# Patient Record
Sex: MALE | Race: WHITE | NOT HISPANIC OR LATINO | Employment: OTHER | URBAN - NONMETROPOLITAN AREA
[De-identification: names, ages, dates, MRNs, and addresses within clinical notes are randomized per-mention and may not be internally consistent; named-entity substitution may affect disease eponyms.]

---

## 2018-06-11 ENCOUNTER — OFFICE VISIT CONVERTED (OUTPATIENT)
Dept: FAMILY MEDICINE CLINIC | Age: 73
End: 2018-06-11
Attending: FAMILY MEDICINE

## 2018-09-24 ENCOUNTER — OFFICE VISIT CONVERTED (OUTPATIENT)
Dept: FAMILY MEDICINE CLINIC | Age: 73
End: 2018-09-24
Attending: FAMILY MEDICINE

## 2019-06-20 ENCOUNTER — OFFICE VISIT CONVERTED (OUTPATIENT)
Dept: FAMILY MEDICINE CLINIC | Age: 74
End: 2019-06-20
Attending: FAMILY MEDICINE

## 2019-06-20 ENCOUNTER — HOSPITAL ENCOUNTER (OUTPATIENT)
Dept: OTHER | Facility: HOSPITAL | Age: 74
Discharge: HOME | End: 2019-06-20
Attending: FAMILY MEDICINE

## 2019-06-20 LAB
ALBUMIN SERPL-MCNC: 4.8 G/DL (ref 3.5–5)
ALBUMIN/GLOB SERPL: 1.6 {RATIO} (ref 1.4–2.6)
ALP SERPL-CCNC: 58 U/L (ref 56–155)
ALT SERPL-CCNC: 23 U/L (ref 10–40)
ANION GAP SERPL CALC-SCNC: 18 MMOL/L (ref 8–19)
AST SERPL-CCNC: 17 U/L (ref 15–50)
BASOPHILS # BLD AUTO: 0.03 10*3/UL (ref 0–0.2)
BASOPHILS NFR BLD AUTO: 0.8 % (ref 0–3)
BILIRUB SERPL-MCNC: 0.5 MG/DL (ref 0.2–1.3)
BUN SERPL-MCNC: 12 MG/DL (ref 5–25)
BUN/CREAT SERPL: 11 {RATIO} (ref 6–20)
CALCIUM SERPL-MCNC: 9.4 MG/DL (ref 8.7–10.4)
CHLORIDE SERPL-SCNC: 101 MMOL/L (ref 99–111)
CHOLEST SERPL-MCNC: 177 MG/DL (ref 107–200)
CHOLEST/HDLC SERPL: 3.7 {RATIO} (ref 3–6)
CONV ABS IMM GRAN: 0.02 10*3/UL (ref 0–0.2)
CONV CO2: 26 MMOL/L (ref 22–32)
CONV CREATININE URINE, RANDOM: 80.7 MG/DL (ref 10–300)
CONV IMMATURE GRAN: 0.5 % (ref 0–1.8)
CONV MICROALBUM.,U,RANDOM: <12 MG/L (ref 0–20)
CONV TOTAL PROTEIN: 7.8 G/DL (ref 6.3–8.2)
CREAT UR-MCNC: 1.12 MG/DL (ref 0.7–1.2)
DEPRECATED RDW RBC AUTO: 43.5 FL (ref 35.1–43.9)
EOSINOPHIL # BLD AUTO: 0.14 10*3/UL (ref 0–0.7)
EOSINOPHIL # BLD AUTO: 3.8 % (ref 0–7)
ERYTHROCYTE [DISTWIDTH] IN BLOOD BY AUTOMATED COUNT: 13.8 % (ref 11.6–14.4)
EST. AVERAGE GLUCOSE BLD GHB EST-MCNC: 183 MG/DL
FERRITIN SERPL-MCNC: 183 NG/ML (ref 30–300)
FOLATE SERPL-MCNC: 15.4 NG/ML (ref 4.8–20)
GFR SERPLBLD BASED ON 1.73 SQ M-ARVRAT: >60 ML/MIN/{1.73_M2}
GLOBULIN UR ELPH-MCNC: 3 G/DL (ref 2–3.5)
GLUCOSE SERPL-MCNC: 223 MG/DL (ref 70–99)
HBA1C MFR BLD: 15.3 G/DL (ref 14–18)
HBA1C MFR BLD: 8 % (ref 3.5–5.7)
HCT VFR BLD AUTO: 45.2 % (ref 42–52)
HDLC SERPL-MCNC: 48 MG/DL (ref 40–60)
IRON SATN MFR SERPL: 20 % (ref 20–55)
IRON SERPL-MCNC: 100 UG/DL (ref 70–180)
LDLC SERPL CALC-MCNC: 97 MG/DL (ref 70–100)
LYMPHOCYTES # BLD AUTO: 1.28 10*3/UL (ref 1–5)
MCH RBC QN AUTO: 29.4 PG (ref 27–31)
MCHC RBC AUTO-ENTMCNC: 33.8 G/DL (ref 33–37)
MCV RBC AUTO: 86.9 FL (ref 80–96)
MICROALBUMIN/CREAT UR: 14.9 MG/G{CRE} (ref 0–25)
MONOCYTES # BLD AUTO: 0.47 10*3/UL (ref 0.2–1.2)
MONOCYTES NFR BLD AUTO: 12.7 % (ref 3–10)
NEUTROPHILS # BLD AUTO: 1.76 10*3/UL (ref 2–8)
NEUTROPHILS NFR BLD AUTO: 47.6 % (ref 30–85)
NRBC CBCN: 0 % (ref 0–0.7)
OSMOLALITY SERPL CALC.SUM OF ELEC: 297 MOSM/KG (ref 273–304)
PLATELET # BLD AUTO: 172 10*3/UL (ref 130–400)
PMV BLD AUTO: 10.2 FL (ref 9.4–12.4)
POTASSIUM SERPL-SCNC: 4.5 MMOL/L (ref 3.5–5.3)
RBC # BLD AUTO: 5.2 10*6/UL (ref 4.7–6.1)
RETICS # AUTO: 0.12 10*6/UL (ref 0.03–0.1)
RETICS/RBC NFR AUTO: 2.35 % (ref 0.51–1.81)
SODIUM SERPL-SCNC: 140 MMOL/L (ref 135–147)
TIBC SERPL-MCNC: 502 UG/DL (ref 245–450)
TRANSFERRIN SERPL-MCNC: 351 MG/DL (ref 215–365)
TRIGL SERPL-MCNC: 160 MG/DL (ref 40–150)
VARIANT LYMPHS NFR BLD MANUAL: 34.6 % (ref 20–45)
VIT B12 SERPL-MCNC: 544 PG/ML (ref 211–911)
VLDLC SERPL-MCNC: 32 MG/DL (ref 5–37)
WBC # BLD AUTO: 3.7 10*3/UL (ref 4.8–10.8)

## 2019-12-20 ENCOUNTER — HOSPITAL ENCOUNTER (OUTPATIENT)
Dept: OTHER | Facility: HOSPITAL | Age: 74
Discharge: HOME | End: 2019-12-20
Attending: FAMILY MEDICINE

## 2019-12-20 ENCOUNTER — OFFICE VISIT CONVERTED (OUTPATIENT)
Dept: FAMILY MEDICINE CLINIC | Age: 74
End: 2019-12-20
Attending: FAMILY MEDICINE

## 2019-12-20 LAB
ALBUMIN SERPL-MCNC: 4.4 G/DL (ref 3.5–5)
ALBUMIN/GLOB SERPL: 1.6 {RATIO} (ref 1.4–2.6)
ALP SERPL-CCNC: 52 U/L (ref 56–155)
ALT SERPL-CCNC: 16 U/L (ref 10–40)
ANION GAP SERPL CALC-SCNC: 16 MMOL/L (ref 8–19)
AST SERPL-CCNC: 13 U/L (ref 15–50)
BASOPHILS # BLD AUTO: 0.03 10*3/UL (ref 0–0.2)
BASOPHILS NFR BLD AUTO: 0.8 % (ref 0–3)
BILIRUB SERPL-MCNC: 0.56 MG/DL (ref 0.2–1.3)
BUN SERPL-MCNC: 11 MG/DL (ref 5–25)
BUN/CREAT SERPL: 11 {RATIO} (ref 6–20)
CALCIUM SERPL-MCNC: 8.9 MG/DL (ref 8.7–10.4)
CHLORIDE SERPL-SCNC: 100 MMOL/L (ref 99–111)
CHOLEST SERPL-MCNC: 165 MG/DL (ref 107–200)
CHOLEST/HDLC SERPL: 3.8 {RATIO} (ref 3–6)
CONV ABS IMM GRAN: 0.03 10*3/UL (ref 0–0.2)
CONV CO2: 26 MMOL/L (ref 22–32)
CONV IMMATURE GRAN: 0.8 % (ref 0–1.8)
CONV TOTAL PROTEIN: 7.2 G/DL (ref 6.3–8.2)
CREAT UR-MCNC: 1.03 MG/DL (ref 0.7–1.2)
DEPRECATED RDW RBC AUTO: 41.9 FL (ref 35.1–43.9)
EOSINOPHIL # BLD AUTO: 0.13 10*3/UL (ref 0–0.7)
EOSINOPHIL # BLD AUTO: 3.3 % (ref 0–7)
ERYTHROCYTE [DISTWIDTH] IN BLOOD BY AUTOMATED COUNT: 13.6 % (ref 11.6–14.4)
EST. AVERAGE GLUCOSE BLD GHB EST-MCNC: 154 MG/DL
GFR SERPLBLD BASED ON 1.73 SQ M-ARVRAT: >60 ML/MIN/{1.73_M2}
GLOBULIN UR ELPH-MCNC: 2.8 G/DL (ref 2–3.5)
GLUCOSE SERPL-MCNC: 163 MG/DL (ref 70–99)
HBA1C MFR BLD: 7 % (ref 3.5–5.7)
HCT VFR BLD AUTO: 41.9 % (ref 42–52)
HDLC SERPL-MCNC: 43 MG/DL (ref 40–60)
HGB BLD-MCNC: 14.1 G/DL (ref 14–18)
LDLC SERPL CALC-MCNC: 102 MG/DL (ref 70–100)
LYMPHOCYTES # BLD AUTO: 1.46 10*3/UL (ref 1–5)
LYMPHOCYTES NFR BLD AUTO: 37.3 % (ref 20–45)
MCH RBC QN AUTO: 28.8 PG (ref 27–31)
MCHC RBC AUTO-ENTMCNC: 33.7 G/DL (ref 33–37)
MCV RBC AUTO: 85.5 FL (ref 80–96)
MONOCYTES # BLD AUTO: 0.55 10*3/UL (ref 0.2–1.2)
MONOCYTES NFR BLD AUTO: 14.1 % (ref 3–10)
NEUTROPHILS # BLD AUTO: 1.71 10*3/UL (ref 2–8)
NEUTROPHILS NFR BLD AUTO: 43.7 % (ref 30–85)
NRBC CBCN: 0 % (ref 0–0.7)
OSMOLALITY SERPL CALC.SUM OF ELEC: 289 MOSM/KG (ref 273–304)
PLATELET # BLD AUTO: 182 10*3/UL (ref 130–400)
PMV BLD AUTO: 10.2 FL (ref 9.4–12.4)
POTASSIUM SERPL-SCNC: 3.9 MMOL/L (ref 3.5–5.3)
RBC # BLD AUTO: 4.9 10*6/UL (ref 4.7–6.1)
SODIUM SERPL-SCNC: 138 MMOL/L (ref 135–147)
TRIGL SERPL-MCNC: 101 MG/DL (ref 40–150)
VLDLC SERPL-MCNC: 20 MG/DL (ref 5–37)
WBC # BLD AUTO: 3.91 10*3/UL (ref 4.8–10.8)

## 2020-09-18 ENCOUNTER — HOSPITAL ENCOUNTER (OUTPATIENT)
Dept: OTHER | Facility: HOSPITAL | Age: 75
Discharge: HOME | End: 2020-09-18
Attending: FAMILY MEDICINE

## 2020-09-18 ENCOUNTER — OFFICE VISIT CONVERTED (OUTPATIENT)
Dept: FAMILY MEDICINE CLINIC | Age: 75
End: 2020-09-18
Attending: FAMILY MEDICINE

## 2020-09-19 LAB
ALBUMIN SERPL-MCNC: 4.9 G/DL (ref 3.5–5)
ALBUMIN/GLOB SERPL: 2 {RATIO} (ref 1.4–2.6)
ALP SERPL-CCNC: 46 U/L (ref 56–155)
ALT SERPL-CCNC: 11 U/L (ref 10–40)
ANION GAP SERPL CALC-SCNC: 15 MMOL/L (ref 8–19)
AST SERPL-CCNC: 14 U/L (ref 15–50)
BILIRUB SERPL-MCNC: 0.51 MG/DL (ref 0.2–1.3)
BUN SERPL-MCNC: 17 MG/DL (ref 5–25)
BUN/CREAT SERPL: 16 {RATIO} (ref 6–20)
CALCIUM SERPL-MCNC: 9.3 MG/DL (ref 8.7–10.4)
CHLORIDE SERPL-SCNC: 103 MMOL/L (ref 99–111)
CHOLEST SERPL-MCNC: 181 MG/DL (ref 107–200)
CHOLEST/HDLC SERPL: 3.3 {RATIO} (ref 3–6)
CONV CO2: 27 MMOL/L (ref 22–32)
CONV CREATININE URINE, RANDOM: 106 MG/DL (ref 10–300)
CONV MICROALBUM.,U,RANDOM: <12 MG/L (ref 0–20)
CONV TOTAL PROTEIN: 7.4 G/DL (ref 6.3–8.2)
CREAT UR-MCNC: 1.09 MG/DL (ref 0.7–1.2)
EST. AVERAGE GLUCOSE BLD GHB EST-MCNC: 123 MG/DL
GFR SERPLBLD BASED ON 1.73 SQ M-ARVRAT: >60 ML/MIN/{1.73_M2}
GLOBULIN UR ELPH-MCNC: 2.5 G/DL (ref 2–3.5)
GLUCOSE SERPL-MCNC: 117 MG/DL (ref 70–99)
HBA1C MFR BLD: 5.9 % (ref 3.5–5.7)
HDLC SERPL-MCNC: 55 MG/DL (ref 40–60)
LDLC SERPL CALC-MCNC: 103 MG/DL (ref 70–100)
MICROALBUMIN/CREAT UR: 11.3 MG/G{CRE} (ref 0–25)
OSMOLALITY SERPL CALC.SUM OF ELEC: 293 MOSM/KG (ref 273–304)
POTASSIUM SERPL-SCNC: 5.1 MMOL/L (ref 3.5–5.3)
SODIUM SERPL-SCNC: 140 MMOL/L (ref 135–147)
TRIGL SERPL-MCNC: 117 MG/DL (ref 40–150)
VLDLC SERPL-MCNC: 23 MG/DL (ref 5–37)

## 2021-03-22 ENCOUNTER — HOSPITAL ENCOUNTER (OUTPATIENT)
Dept: OTHER | Facility: HOSPITAL | Age: 76
Discharge: HOME | End: 2021-03-22
Attending: FAMILY MEDICINE

## 2021-03-22 ENCOUNTER — OFFICE VISIT CONVERTED (OUTPATIENT)
Dept: FAMILY MEDICINE CLINIC | Age: 76
End: 2021-03-22
Attending: FAMILY MEDICINE

## 2021-03-22 LAB
ALBUMIN SERPL-MCNC: 4.2 G/DL (ref 3.5–5)
ALBUMIN/GLOB SERPL: 1.6 {RATIO} (ref 1.4–2.6)
ALP SERPL-CCNC: 45 U/L (ref 56–155)
ALT SERPL-CCNC: 14 U/L (ref 10–40)
ANION GAP SERPL CALC-SCNC: 15 MMOL/L (ref 8–19)
AST SERPL-CCNC: 14 U/L (ref 15–50)
BILIRUB SERPL-MCNC: 0.53 MG/DL (ref 0.2–1.3)
BUN SERPL-MCNC: 15 MG/DL (ref 5–25)
BUN/CREAT SERPL: 13 {RATIO} (ref 6–20)
CALCIUM SERPL-MCNC: 9.1 MG/DL (ref 8.7–10.4)
CHLORIDE SERPL-SCNC: 103 MMOL/L (ref 99–111)
CHOLEST SERPL-MCNC: 155 MG/DL (ref 107–200)
CHOLEST/HDLC SERPL: 3.5 {RATIO} (ref 3–6)
CONV CO2: 25 MMOL/L (ref 22–32)
CONV TOTAL PROTEIN: 6.9 G/DL (ref 6.3–8.2)
CREAT UR-MCNC: 1.2 MG/DL (ref 0.7–1.2)
EST. AVERAGE GLUCOSE BLD GHB EST-MCNC: 146 MG/DL
GFR SERPLBLD BASED ON 1.73 SQ M-ARVRAT: 59 ML/MIN/{1.73_M2}
GLOBULIN UR ELPH-MCNC: 2.7 G/DL (ref 2–3.5)
GLUCOSE SERPL-MCNC: 134 MG/DL (ref 70–99)
HBA1C MFR BLD: 6.7 % (ref 3.5–5.7)
HDLC SERPL-MCNC: 44 MG/DL (ref 40–60)
LDLC SERPL CALC-MCNC: 88 MG/DL (ref 70–100)
OSMOLALITY SERPL CALC.SUM OF ELEC: 291 MOSM/KG (ref 273–304)
POTASSIUM SERPL-SCNC: 4.1 MMOL/L (ref 3.5–5.3)
SODIUM SERPL-SCNC: 139 MMOL/L (ref 135–147)
TRIGL SERPL-MCNC: 113 MG/DL (ref 40–150)
VLDLC SERPL-MCNC: 23 MG/DL (ref 5–37)

## 2021-04-12 ENCOUNTER — HOSPITAL ENCOUNTER (OUTPATIENT)
Dept: OTHER | Facility: HOSPITAL | Age: 76
Discharge: HOME | End: 2021-04-12
Attending: FAMILY MEDICINE

## 2021-04-12 LAB — PSA SERPL-MCNC: 4.36 NG/ML (ref 0–4)

## 2021-05-18 NOTE — PROGRESS NOTES
Judson Ochoa 1945     Office/Outpatient Visit    Visit Date: Mon, Jun 11, 2018 10:28 am    Provider: Jose A Bell MD (Assistant: Susan Nazario MA)    Location: [facility]        Electronically signed by Jose A Bell MD on  06/16/2018 10:55:46 AM                             SUBJECTIVE:        CC:     Mr. Ochoa is a 73 year old White male.  This is a follow-up visit.  physical exam         HPI:         Mr. Ochoa is here for a Medicare wellness visit.  A review of cognitive impairment was performed and was negative.  A review of functional ability and level of safety was performed and was negative He denies having trouble hearing the TV/radio when others do not, having to strain to hear or understand conversations and wearing hearing aid(s).  Concerning home safety, he reports that at home he DOES have a slippery bath or shower and functioning smoke alarms, but not throw rugs, poor lighting, grab bars in the bath or handrails on stairs.      Immunization Status: ** Has not received pneumococcal vaccination; ** Has not received influenza vaccine for this season; ** Has not received hepatitis B immunization; Age>60, no shingles vaccination;     Physical Activity: ** Exercises infrequently; Has not had any falls or only one fall without injury in the past year.  Advance Care Directive updated today in [facility] Tobacco: Current Smoker: He currently smokes every day, 1/2 to 1 pack per day.    Preventative Health updated today.          PHQ-9 Depression Screening: Completed form scanned and in chart; Total Score 0 Alcohol Consumption Screening: Completed form scanned and in chart; Total Score 3         With regard to the type 2 diabetes, compliance with treatment has been good.  He specifically denies associated symptoms, including nocturia, polydipsia and polyuria.  He does not perform home blood glucose monitoring.          With regard to the mixed hyperlipidemia, compliance with  treatment has been good.  He specifically denies associated symptoms, including muscle pain and weakness.      Had a pack of Pall Mall in his pocket.     ROS:     CONSTITUTIONAL:  Negative for chills, fatigue, fever and weight change.      CARDIOVASCULAR:  Negative for chest pain, orthopnea, paroxysmal nocturnal dyspnea and pedal edema.      RESPIRATORY:  Negative for dyspnea and cough.      GASTROINTESTINAL:  Negative for abdominal pain, heartburn, constipation, diarrhea, and stool changes.      GENITOURINARY:  Negative for dysuria and polyuria.      PSYCHIATRIC:  Negative for anxiety and depression.          PMH/FMH/SH:     Last Reviewed on 10/11/2017 12:59 PM by Jose A Bell    Past Medical History:     UNREMARKABLE         PREVENTIVE HEALTH MAINTENANCE             BONE DENSITY: has never been done     COLORECTAL CANCER SCREENING:; colonoscopy with normal results; ;10-     EYE EXAM: none documented     MAMMOGRAM: N/A     PAP SMEAR: N/A     PSA: was last done 2014 with normal results (3.09)         PAST MEDICAL HISTORY             ADVANCE DIRECTIVE Living will on file         Surgical History:         Other Surgeries:    circumcision ;     Procedures:    Colonoscopy ( /normal )         Family History:     Father: Type 2 Diabetes     Mother:  at age 82; Cause of death was CHF;  Coronary Artery Disease;  Type 2 Diabetes     Brother(s): Cerebrovascular Accident;  Type 1 Diabetes         Social History: enjoys hunting -     Occupation: Manager of Safety and Staff [employer]     Marital Status:      Children: 1 child         Tobacco/Alcohol/Supplements:     Last Reviewed on 2018 10:32 AM by Susan Nazario    Tobacco: Current Smoker: He currently smokes every day, 1 pack per day.  Quit smoking 2010 Smoking again since 2011. Quit May 1, 2012         Substance Abuse History:     Last Reviewed on 10/11/2017 12:59 PM by Jose A Bell     None         Mental Health History:     Last Reviewed on 10/11/2017 12:59 PM by Jose A Bell        Communicable Diseases (eg STDs):     Last Reviewed on 10/11/2017 12:59 PM by Jose A Bell            Allergies:     Last Reviewed on 10/11/2017 12:59 PM by Jose A Bell      No Known Drug Allergies.         Current Medications:     Last Reviewed on 10/11/2017 12:59 PM by Jose A Bell    Tricor 145mg Tablet Take 1 tablet(s) by mouth daily         OBJECTIVE:        Vitals:         Current: 6/11/2018 10:32:18 AM    Ht:  6 ft, 2.75 in;  Wt: 210.6 lbs;  BMI: 26.5    T: 97.3 F (oral);  BP: 128/81 mm Hg (right arm, sitting);  P: 59 bpm (right arm (BP Cuff), sitting);  sCr: 1.06 mg/dL;  GFR: 69.80        Exams:     PHYSICAL EXAM:     GENERAL:  well developed and nourished; appropriately groomed; in no apparent distress;     EYES: Nonicteric and with unremarkable lids, iris and pupils;     E/N/T:  normal EACs, TMs, nasal/oral mucosa, teeth, gingiva, and oropharynx;     NECK: carotid exam reveals no bruits;     RESPIRATORY: normal respiratory rate and pattern with no distress; normal breath sounds with no rales, rhonchi, wheezes or rubs;     CARDIOVASCULAR: normal rate; rhythm is regular;  no systolic murmur;     LYMPHATIC: no enlargement of cervical or facial nodes;     MUSCULOSKELETAL: normal overall tone No pedal edema.;     NEUROLOGIC: No lateralizing deficit.;     PSYCHIATRIC:  appropriate affect and demeanor; normal speech pattern; grossly normal memory;         ASSESSMENT           V70.0   Z00.00  Health checkup              DDx:     V79.0   Z13.89  Screening for depression              DDx:     250.00   E11.9  Type 2 diabetes              DDx:     272.2   E78.2  Mixed hyperlipidemia              DDx:     305.1   F17.210  Tobacco abuse              DDx:         ORDERS:         Lab Orders:       [id number]  DIAB - [facility] LIPID,CMP, A1C: 41335, 92169, 90869  (Send-Out)           Other Orders:          Smoking and Tobacco Cessation 3 to 10 minutes  (In-House)           Depression screen negative  (In-House)           Subsequent Annual Well Visit Medicare (x1)                 PLAN:          Health checkup Reviewed the preventive service recommendations and created individualized handout.  Will get US for AAA screen.  I see CT in 2008 did show early evidence. Recommend Hep A shots          Screening for depression     MIPS Negative Depression Screen           Orders:         Depression screen negative  (In-House)            Type 2 diabetes     LABORATORY:  Labs ordered to be performed today include Diabetes Panel 1; CMP, Lipid, A1C.            Orders:       [id number]  DIAB - [facility] LIPID,CMP, A1C: 96099, 73697, 21920  (Send-Out)            Mixed hyperlipidemia cont rx, ck lab          Tobacco abuse Counseled x 4 min on smoking cessation and ways in which we could help. He has done the [place] method in past. Gave info on Quit Now too.           Orders:         Smoking and Tobacco Cessation 3 to 10 minutes  (In-House)               CHARGE CAPTURE           **Please note: ICD descriptions below are intended for billing purposes only and may not represent clinical diagnoses**        Primary Diagnosis:         V70.0 Health checkup            Z00.00    Encounter for general adult medical examination without abnormal findings              Orders:          57003   Preventive medicine, established patient, age 65+ years  (In-House)                                           Subsequent Annual Well Visit Medicare (x1)         V79.0 Screening for depression            Z13.89    Encounter for screening for other disorder              Orders:          08697 -25  Office/outpatient visit; established patient, level 3  (In-House)                Depression screen negative  (In-House)           250.00 Type 2 diabetes            E11.9    Type 2 diabetes mellitus without complications    272.2  Mixed hyperlipidemia            E78.2    Mixed hyperlipidemia    305.1 Tobacco abuse            F17.210    Nicotine dependence, cigarettes, uncomplicated              Orders:             Smoking and Tobacco Cessation 3 to 10 minutes  (In-House)               ADDENDUMS:      ____________________________________    Addendum: 06/18/2018 09:03 AM - Noelle Blankenship         Visit Note Faxed to:        Magali Sneed (Vascular Medicine); Number [phone]

## 2021-05-18 NOTE — PROGRESS NOTES
"Judson Ochoa. 1945     Office/Outpatient Visit    Visit Date: Mon, Sep 24, 2018 01:49 pm    Provider: Jose A Bell MD (Assistant: Eileen Gillespie MA)    Location: [facility]        Electronically signed by Jose A Blel MD on  09/26/2018 01:18:58 PM                             SUBJECTIVE:        CC:     Mr. Ochoa is a 73 year old White male.  presents today due to complaints of lower right back pain X 2 weeks         HPI:     Long story short he had his AAA repaired by Dr Ruvalcaba at . Was 8 hour procedure and stayed overnight.  He was quite bruised after the procedure with dependent bruising in groin and legs, but that has all resolved.  He has a follow up scheduled Oct 22nd.     A few days after discharge, despite the hematoma, he went dove hunting.  Was sitting stooped over all day which lead to some back pain and also some testicular pain. He says he even had some blood in his shorts one day that he thinks was from semen.  he did have a ward catheter while in hospital, which he says \"torn me up for 4-5 days after I got home.\" He is not having any urinary symptoms now.         Dx with type 2 diabetes; compliance with treatment has been good.  He specifically denies associated symptoms, including nocturia, polydipsia and polyuria.  He reports home blood glucose readings have been high, with average fasting readings in the mid-200s mg/dL range. He checks his glucose 1 to 2 times daily.  Blames the sweet corn.  In past two-three weeks has been in 120-130 range     ROS:     CONSTITUTIONAL:  Negative for chills, fatigue, fever and weight change.      CARDIOVASCULAR:  Negative for chest pain, orthopnea, paroxysmal nocturnal dyspnea and pedal edema.      RESPIRATORY:  Negative for dyspnea and cough.      GASTROINTESTINAL:  Negative for abdominal pain, heartburn, constipation, diarrhea, and stool changes.      GENITOURINARY:  Negative for dysuria and polyuria.      PSYCHIATRIC:  " Negative for anxiety and depression.          PMH/FMH/SH:     Last Reviewed on 10/11/2017 12:59 PM by Jose A Bell    Past Medical History:     UNREMARKABLE         PREVENTIVE HEALTH MAINTENANCE             BONE DENSITY: has never been done     COLORECTAL CANCER SCREENING:; colonoscopy with normal results; ;10-     EYE EXAM: none documented     MAMMOGRAM: N/A     PAP SMEAR: N/A     PSA: was last done 2014 with normal results (3.09)         PAST MEDICAL HISTORY             ADVANCE DIRECTIVE Living will on file         Surgical History:         Other Surgeries:    circumcision ;     Procedures:    Colonoscopy ( /normal )         Family History:     Father: Type 2 Diabetes     Mother:  at age 82; Cause of death was CHF;  Coronary Artery Disease;  Type 2 Diabetes     Brother(s): Cerebrovascular Accident;  Type 1 Diabetes         Social History: enjoys [employer] -     Occupation: Manager of Safety and Staff [employer]     Marital Status:      Children: 1 child         Tobacco/Alcohol/Supplements:     Last Reviewed on 2018 10:38 AM by Susan Nazario    Tobacco: Current Smoker: He currently smokes every day, 1 pack per day.  Quit smoking 2010 Smoking again since 2011. Quit May 1, 2012         Substance Abuse History:     Last Reviewed on 10/11/2017 12:59 PM by Jose A Bell    None         Mental Health History:     Last Reviewed on 10/11/2017 12:59 PM by Jose A Bell        Communicable Diseases (eg STDs):     Last Reviewed on 10/11/2017 12:59 PM by Jose A Bell            Allergies:     Last Reviewed on 2018 10:38 AM by Susan Nazario      No Known Drug Allergies.         Current Medications:     Last Reviewed on 2018 10:40 AM by Susan Nazario    Metformin HCl 500mg Tablet 1 po TID     Tricor 145mg Tablet Take 1 tablet(s) by mouth daily     Metformin HCl 500mg Tablets, Extended Release 1 TID          OBJECTIVE:        Vitals:         Current: 9/24/2018 1:54:27 PM    Ht:  6 ft, 2.75 in;  Wt: 217.2 lbs;  BMI: 27.3    T: 97.7 F (oral);  BP: 143/84 mm Hg (right arm, sitting);  P: 52 bpm (right arm (BP Cuff), sitting);  sCr: 1.07 mg/dL;  GFR: 70.06        Exams:     PHYSICAL EXAM:     GENERAL:  well developed and nourished; appropriately groomed; in no apparent distress;     EYES: Nonicteric and with unremarkable lids, iris and pupils;     E/N/T:  normal EACs, TMs, nasal/oral mucosa, teeth, gingiva, and oropharynx;     NECK: carotid exam reveals no bruits;     RESPIRATORY: normal respiratory rate and pattern with no distress; normal breath sounds with no rales, rhonchi, wheezes or rubs;     CARDIOVASCULAR: normal rate; rhythm is regular;  no systolic murmur;     GASTROINTESTINAL: nontender, nondistended; no hepatosplenomegaly or masses; no bruits; has a couple scars in groins from his procedure, but no bruising now;     LYMPHATIC: no enlargement of cervical or facial nodes;     MUSCULOSKELETAL: normal overall tone No pedal edema.;     NEUROLOGIC: No lateralizing deficit.;     PSYCHIATRIC:  appropriate affect and demeanor; normal speech pattern; grossly normal memory;         ASSESSMENT           441.4   I71.4  AAA              DDx:     724.5   M54.5  Back pain              DDx:     608.82   R36.1  Hematospermia              DDx:     250.00   E11.9  Type 2 diabetes              DDx:         ORDERS:         Lab Orders:       [id number]  DIAB1 - [facility] LIPID,CMP, A1C: 93326, 58962, 20240  (Send-Out)         16558  BDUA - [facility] Urinalysis, automated, with micro  (Send-Out)         65380  CB - [facility] CBC w/o diff  (Send-Out)                   PLAN: declined flu shot Recommended Hep A vaccine, but he declined.          AAA Will be following up with Dr. Berg Oct 22nd. We will send for records.     LABORATORY:  Labs ordered to be performed today include CBC W/O DIFF.            Orders:       [id number]  [facility] - [facility] CBC w/o diff   (Send-Out)            Back pain Sounds like it was musculoskeletal related to his posture while hunting. Better now, so will follow.           Hematospermia check urine.  I suspect was related to the catheter     LABORATORY:  Labs ordered to be performed today include urinalysis with micro.            Orders:       [id number]  [facility]  (Send-Out)            Type 2 diabetes check lab, continue Rx,     LABORATORY:  Labs ordered to be performed today include Diabetes Panel 1; CMP, Lipid, A1C.            Orders:       [id number]  DIAB - [facility] LIPID,CMP, A1C: 80508, 31223, 22031  (Send-Out)               CHARGE CAPTURE           **Please note: ICD descriptions below are intended for billing purposes only and may not represent clinical diagnoses**        Primary Diagnosis:         441.4 AAA            I71.4    Abdominal aortic aneurysm, without rupture              Orders:          27114   Office/outpatient visit; established patient, level 4  (In-House)           724.5 Back pain            M54.5    Low back pain    608.82 Hematospermia            R36.1    Hematospermia    250.00 Type 2 diabetes            E11.9    Type 2 diabetes mellitus without complications

## 2021-05-18 NOTE — PROGRESS NOTES
Judson Ochoa 1945     Office/Outpatient Visit    Visit Date: Thu, Jun 20, 2019 11:54 am    Provider: Jose A Bell MD (Assistant: Sarah Spurling, MA)    Location: [facility]        Electronically signed by Jose A Bell MD on  06/26/2019 10:49:29 AM                             SUBJECTIVE:        CC:     Mr. Ochoa is a 74 year old White male.  The patient is accompanied into the exam room by his spouse and Lili (wife).  Medicare Wellness. No longer taking plavix.          HPI:         Mr. Ochoa is here for a Medicare wellness visit.  The required HRA questions are integrated within this visit note. Family medical history and individual medical/surgical history were reviewed and updated.  A current height, weight, BMI, blood pressure, and pulse were recorded in the vitals section of the note and have been reviewed. Patient's medications, including supplements, were recorded in the chart and reviewed.  Current providers and suppliers were reviewed and updated.          Self-Assessment of Health: He rates his health as excellent. He rates his confidence of being able to control/manage most of his health problems as very confident. His physical/emotional health has limited his social activites not at all.  A review of cognitive impairment was performed, including ability to drive a car, manage finances, and any memory changes, and was found to be negative.  A review of functional ability, including bathing, dressing, walking, and urine/bowel continence as well as level of safety was performed and was found to be negative.  Falls Risk: Has not had any falls or only one fall without injury in the past year.  In regard to hearing, he reports having trouble hearing the TV/radio when others do not and having to strain to hear or understand conversations, but not wearing hearing aid(s).  Concerning home safety, he reports that at home he DOES have adequate lighting, a skid  resistant shower/tub, grab bars in the bath, handrails on stairs and functioning smoke alarms, but not absence of throw rugs.  Physical Activity: Pt stays active, lots of walking and gardening, mowing.; Type of diet patient normally eats is described as well-balanced with fruits and vegetables Tobacco: He has a past history of cigarette smoking; quit date:  June 2018.  Preventative Health updated today.          PHQ-9 Depression Screening: Completed form scanned and in chart; Total Score 0 Alcohol Consumption Screening: Completed form scanned and in chart; Total Score 11 Concerning screening for depression,         Concerning mixed hyperlipidemia, compliance with treatment has been good.  He specifically denies associated symptoms, including muscle pain and weakness.          Type 2 diabetes details; compliance with treatment has been good.  He specifically denies associated symptoms, including nocturia, polydipsia and polyuria.  He does not perform home blood glucose monitoring.      He recently is undergone repair of AAA at [facility].  He did well with the surgery and has made good recovery. Reviewed the results from the vascular surgeon and note is made there about a renal mass.  Subsequently referred to urology.  In his wife state that they really were not notified about the renal mass or the urology referral until after the visit.  I reviewed that information with him and we also reviewed his previous CT scan which probably reflects the same mass.  It is reassuring that it is chronic in nature but I think is probably still wise for him to go ahead and see the urologist.  He can just take a copy of his previous scan results along with him so they can be compared.     We also reviewed his lab work from the end of last year which showed that he had anemia and leukocytopenia discussed possible significance that possible underlying myelodysplasia.     He does report some difficulty with erectile  function.  Has used Cialis in the past.  Discussed the use sildenafil since it is now generic and he wants to give that a try         PHQ-9 Depression Screening: Completed form scanned and in chart; Total Score 0 Alcohol Consumption Screening: Completed form scanned and in chart; Total Score 1     PMH/FMH/SH:     Last Reviewed on 2019 12:52 PM by Jose A Bell    Past Medical History:     UNREMARKABLE         PREVENTIVE HEALTH MAINTENANCE             COLORECTAL CANCER SCREENING: Up to date (colonoscopy q10y; sigmoidoscopy q5y; Cologuard q3y) was last done 10-05-07, Results are in chart; colonoscopy with normal results; ;10-     DENTAL CLEANING: Over a year.      EYE EXAM: was last done 2019     PSA: was last done 2014 with normal results (3.09)         PAST MEDICAL HISTORY             ADVANCE DIRECTIVE Living will on file         Surgical History:         Other Surgeries:    circumcision ;     Procedures:    Colonoscopy ( /normal )     AAA repair via Endovascular Stenting ;         Family History:     Father: Type 2 Diabetes     Mother:  at age 82; Cause of death was CHF;  Coronary Artery Disease;  Type 2 Diabetes     Brother(s): Cerebrovascular Accident;  Type 1 Diabetes         Social History: enjoys hunting -     Occupation: Manager of Safety and Staff [employer]     Marital Status:      Children: 1 child         Tobacco/Alcohol/Supplements:     Last Reviewed on 2019 11:58 AM by Spurling, Sarah C    Tobacco: He has a past history of cigarette smoking; quit date:  2018.          Substance Abuse History:     Last Reviewed on 10/11/2017 12:59 PM by Jose A Bell    None         Mental Health History:     Last Reviewed on 10/11/2017 12:59 PM by Jose A Bell        Communicable Diseases (eg STDs):     Last Reviewed on 10/11/2017 12:59 PM by Jose A Bell            Allergies:     Last Reviewed on 2019  11:58 AM by Spurling, Sarah C      No Known Drug Allergies.         Current Medications:     Last Reviewed on 6/20/2019 11:59 AM by Spurling, Sarah C    Metformin HCl 500mg Tablet 1 po TID     Tricor 145mg Tablet Take 1 tablet(s) by mouth daily     Aspirin (ASA) 81mg Tablets, Enteric Coated 1 tab daily         OBJECTIVE:        Vitals:         Current: 6/20/2019 12:05:59 PM    Ht:  6 ft, 2.75 in;  Wt: 210.8 lbs;  BMI: 26.5    T: 97.6 F (oral);  BP: 140/77 mm Hg (left arm, sitting);  P: 60 bpm (left arm (BP Cuff), sitting);  sCr: 1.16 mg/dL;  GFR: 62.91    VA: 20/40 OD, 20/30 OS (near, with correction)        ASSESSMENT           V70.0   Z00.00  Health checkup              DDx:     V79.0   Z13.89  Screening for depression              DDx:     272.2   E78.2  Mixed hyperlipidemia              DDx:     250.00   E11.9  Type 2 diabetes              DDx:     441.4   I71.4  AAA              DDx:     236.91   D41.02  Renal mass              DDx:     288.50   D72.819  Leukocytopenia, unspecified              DDx:     285.9   D64.9  Unspecified anemia              DDx:     302.72   F52.21  Erectile dysfunction              DDx:         ORDERS:         Meds Prescribed:       Sildenafil Citrate 20mg Tablet Take 1, 2, 3, 4, or 5 tablets 1 hour before sexual activity  #100 (One [place]) tablet(s) Refills: 0         Lab Orders:       43129  BDCBC - [facility] CBC with 3 part diff  (Send-Out)         70625  FERR - [facility] Ferritin Serum  (Send-Out)         95603  FOL - [facility] Folate; folic acid serum  (Send-Out)         47023  RETEC - [facility] Reticulocyte count  (Send-Out)         17388  IRONP - [facility] Iron and TIBC  (Send-Out)         34578  VB12 - [facility] Vitamin B12  (Send-Out)         49939  DIAB2 - [facility] CMP A1C LIPID AND MICRO ALBUM CR RATIO: [id number]  (Send-Out)           Other Orders:         Depression screen negative  (In-House)           Subsequent Annual Well Visit Medicare (x1)                 PLAN:          Health  checkup We reviewed the preventive service recommendations and created an individualized handout          Screening for depression     MIPS Negative Depression Screen           Orders:         Depression screen negative  (In-House)            Mixed hyperlipidemia Continue his current medication.  We will predicate in changes based on lab results          Type 2 diabetes We will go ahead and check labs and predicate any medication changes based on those results     LABORATORY:  Labs ordered to be performed today include Diabetes Panel 2;CMP, A1C, Lipid, Microalbumin:Creatinine Ratio.            Orders:       18530  DIAB2 - [facility] CMP A1C LIPID AND MICRO ALBUM CR RATIO: [id number],[id number],89237,83005,20190  (Send-Out)            AAA Follow with vascular surgery as per their direction          Renal mass I do think he should keep the appointment with urologist.  Take a copy of the previous CT scan results with him          Leukocytopenia, unspecified We will get repeat labs.  May end up needing further evaluation by hematology          Unspecified anemia     LABORATORY:  Labs ordered to be performed today include Anemia profile CBC ferritin Folate Reticulocyte ct Serum iron Vitamin B12.            Orders:       21804  BDCBC - [facility] CBC with 3 part diff  (Send-Out)         97432  FERR - [facility] Ferritin Serum  (Send-Out)         53421  FOL - [facility] Folate; folic acid serum  (Send-Out)         12629  RETEC - [facility] Reticulocyte count  (Send-Out)         63705  IRONP - [facility] Iron and TIBC  (Send-Out)         09302  VB12 - [facility] Vitamin B12  (Send-Out)            Erectile dysfunction           Prescriptions:       Sildenafil Citrate 20mg Tablet Take 1, 2, 3, 4, or 5 tablets 1 hour before sexual activity  #100 (One [place]) tablet(s) Refills: 0             Patient Recommendations:    Dragon transcription disclaimer:        Much of this encounter note is an electronic transcription/translation of spoken language to printed text.  The electronic  translation of spoken language may permit erroneous, or at times, nonsensical words or phrases to be inadvertently transcribed.  Although I have reviewed the note for such errors, some may still exist.             CHARGE CAPTURE           **Please note: ICD descriptions below are intended for billing purposes only and may not represent clinical diagnoses**        Primary Diagnosis:         V70.0 Health checkup            Z00.00    Encounter for general adult medical examination without abnormal findings              Orders:          90597   Preventive medicine, established patient, age 65+ years  (In-House)                                           Subsequent Annual Well Visit Medicare (x1)         V79.0 Screening for depression            Z13.89    Encounter for screening for other disorder              Orders:          19329 -25  Office/outpatient visit; established patient, level 4  (In-House)                Depression screen negative  (In-House)           272.2 Mixed hyperlipidemia            E78.2    Mixed hyperlipidemia    250.00 Type 2 diabetes            E11.9    Type 2 diabetes mellitus without complications    441.4 AAA            I71.4    Abdominal aortic aneurysm, without rupture    236.91 Renal mass            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    288.50 Leukocytopenia, unspecified            D72.819    Decreased white blood cell count, unspecified    285.9 Unspecified anemia            D64.9    Anemia, unspecified    302.72 Erectile dysfunction            F52.21    Male erectile disorder        ADDENDUMS:      ____________________________________    Addendum: 06/27/2019 11:26 PM - Jose A Bell        PHYSICAL EXAM:     GENERAL:  well developed and nourished; appropriately groomed; in no apparent distress;     EYES: Nonicteric and with unremarkable lids, iris and pupils;     E/N/T:  normal EACs, TMs, nasal/oral mucosa, teeth, gingiva, and oropharynx;     NECK:  carotid exam reveals no bruits;     RESPIRATORY: normal respiratory rate and pattern with no distress; normal breath sounds with no rales, rhonchi, wheezes or rubs;     CARDIOVASCULAR: normal rate; rhythm is regular;  no systolic murmur;     GASTROINTESTINAL: nontender, nondistended; no hepatosplenomegaly or masses; no bruits; has a couple scars in groins from his procedure, but no bruising now;     LYMPHATIC: no enlargement of cervical or facial nodes;     MUSCULOSKELETAL: normal overall tone No pedal edema.; Feet without lesions. DP, PT pulses palpable bilat    NEUROLOGIC: No lateralizing deficit.; Monofilament testing feet okay bilat    PSYCHIATRIC:  appropriate affect and demeanor; normal speech pattern; grossly normal memory;

## 2021-05-18 NOTE — PROGRESS NOTES
Judson Ochoa  1945     Office/Outpatient Visit    Visit Date: Mon, Mar 22, 2021 11:19 am    Provider: Jose A Bell MD (Assistant: La Uribe MA)    Location: [facility]        Electronically signed by Jose A Bell MD on  03/22/2021 12:37:12 PM                             Subjective:        CC: Mr. Ochoa is a 75 year old White male.  This is a follow-up visit.  mcw pt states he is not taking aspirin         HPI:           Mr. Ochoa is here for a Medicare wellness visit.          Self-Assessment of Health: He rates his health as very good. He rates his confidence of being able to control/manage most of his health problems as very confident. His physical/emotional health has limited his social activites not at all.  A review of cognitive impairment was performed, including ability to drive a car, manage finances, and any memory changes, and was found to be negative.  A review of functional ability, including bathing, dressing, walking, and urine/bowel continence as well as level of safety was performed and was found to be negative.  Falls Risk: Has not had any falls or only one fall without injury in the past year.  He denies having trouble hearing the TV/radio when others do not, having to strain to hear or understand conversations and wearing hearing aid(s).  Concerning home safety, he reports that at home he DOES have adequate lighting, a skid resistant shower/tub, grab bars in the bath, functioning smoke alarms and absence of throw rugs, but not handrails on stairs.  Physical Activity: He exercises but less than 20 minutes 3 days per week; Type of diet patient normally eats is described as well-balanced with fruits and vegetables Tobacco: Current Smoker: He currently smokes every day, 1 pack per day.  Preventative Health updated today.        Patient also here to follow-up on chronic health conditions.  In regards to his diabetes he continues to take the  Metformin without difficulty.  He does check his blood sugar occasionally and generally runs good.He continues to take TriCor for his hyperlipidemia without adverse effect.      Has a history of AAA followed by vascular surgery at [facility].  Says he has a follow-up appointment in April.  He also recently saw urology at [facility] they have been following a renal mass and determine its likely hemorrhagic cyst.  They have released him from follow-up stating that he can be monitored with the CT scans from vascular surgery on a as needed basis.      He is not having any symptoms of claudication but does report having plantar fasciitis type discomfort in his feet.  He does stretch before bedtime.  Has inserts in his shoes.          PHQ-9 Depression Screening: Completed form scanned and in chart; Total Score 0     ROS:     CONSTITUTIONAL:  Negative for chills, fatigue and fever.      CARDIOVASCULAR:  Negative for chest pain, orthopnea, paroxysmal nocturnal dyspnea and pedal edema.      RESPIRATORY:  Negative for dyspnea and cough.      GASTROINTESTINAL:  Negative for abdominal pain, heartburn, constipation, diarrhea, and stool changes.      GENITOURINARY:  Negative for dysuria and polyuria.      PSYCHIATRIC:  Negative for anxiety and depression.          Past Medical History / Family History / Social History:         Last Reviewed on 3/22/2021 12:29 PM by Jose A Bell    Past Medical History:     UNREMARKABLE         PREVENTIVE HEALTH MAINTENANCE             COLORECTAL CANCER SCREENING: Up to date (colonoscopy q10y; sigmoidoscopy q5y; Cologuard q3y) was last done 10-05-07, Results are in chart; colonoscopy with normal results; ;10-     DENTAL CLEANING: Over a year.      EYE EXAM: was last done April 2019     PSA: was last done 05- with normal results (3.09)         PAST MEDICAL HISTORY             ADVANCE DIRECTIVE Living will on file         Surgical History:         Other  Surgeries:    circumcision ;     Procedures:    Colonoscopy ( /normal )     AAA repair via Endovascular Stenting ;         Family History:     Father: Type 2 Diabetes     Mother:  at age 82; Cause of death was CHF;  Coronary Artery Disease;  Type 2 Diabetes     Brother(s): Cerebrovascular Accident;  Type 1 Diabetes         Social History: enjoys [place] -     Occupation: Manager of Safety and Staff [employer]     Marital Status:      Children: 1 child         Tobacco/Alcohol/Supplements:     Last Reviewed on 3/22/2021 11:38 AM by La Uribe    Tobacco: Current Smoker: He currently smokes every day, 1 pack per day.          Substance Abuse History:     Last Reviewed on 10/11/2017 12:59 PM by Jose A Bell    None         Mental Health History:     Last Reviewed on 10/11/2017 12:59 PM by Jose A Bell        Communicable Diseases (eg STDs):     Last Reviewed on 10/11/2017 12:59 PM by Jose A Bell        Allergies:     Last Reviewed on 3/22/2021 11:38 AM by La Uribe    No Known Allergies.        Current Medications:     Last Reviewed on 3/22/2021 11:38 AM by La Uribe    metFORMIN 500 mg oral tablet [TAKE 1 TABLET THREE TIMES A DAY]    Tricor 145 mg oral tablet [TAKE 1 TABLET DAILY]    aspirin 81 mg oral tablet, delayed release (enteric coated) [1 tab daily]    sildenafil (pulm.hypertension) 20 mg oral tablet [TAKE 1, 2, 3, 4, OR 5 TABLETS 1 HOUR BEFORE SEXUAL ACTIVITY]    blood sugar diagnostic strips  [use to check bs daily]    FreeStyle Lite Meter kit  [use to check bs daily with freestyle lite strips]        Objective:        Vitals:         Current: 3/22/2021 11:40:46 AM    Ht:  6 ft, 2.75 in;  Wt: 211 lbs;  BMI: 26.5T: 96.8 F (temporal);  BP: 155/79 mm Hg (left arm, sitting);  P: 67 bpm (left arm (BP Cuff), sitting);  sCr: 1.09 mg/dL;  GFR: 66.03        Repeat:     11:46:14 AM  BP:   126/67mm Hg (left arm, sitting, P-70)      Exams: right dub contracture    PHYSICAL EXAM:     GENERAL:  well developed and nourished; appropriately groomed; in no apparent distress;     EYES: Nonicteric and with unremarkable lids, iris and pupils;     E/N/T:  normal EACs, TMs, nasal/oral mucosa, teeth, gingiva, and oropharynx;     NECK: carotid exam reveals no bruits;     RESPIRATORY: normal respiratory rate and pattern with no distress; normal breath sounds with no rales, rhonchi, wheezes or rubs;     CARDIOVASCULAR: normal rate; rhythm is regular;  no systolic murmur;     GASTROINTESTINAL: nontender, nondistended; no hepatosplenomegaly or masses; no bruits; has a couple scars in groins from his procedure, well healed;     LYMPHATIC: no enlargement of cervical or facial nodes;     MUSCULOSKELETAL: normal overall tone No pedal edema.; Dupuytren's contracture right hand    NEUROLOGIC: No lateralizing deficit.;     PSYCHIATRIC:  appropriate affect and demeanor; normal speech pattern; grossly normal memory;         Assessment:         Z00.00   Encounter for general adult medical examination without abnormal findings       E78.2   Mixed hyperlipidemia       E11.9   Type 2 diabetes mellitus without complications       I71.4   Abdominal aortic aneurysm, without rupture       I73.9   Peripheral vascular disease, unspecified       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       Z13.31   Encounter for screening for depression       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.0   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  Not inclined to take the Covid vaccine he says.  I did address some of his concerns and encouraged him to reconsider    ADVANCE DIRECTIVES (Please update in PMH): Living will on file         Mixed hyperlipidemiaCheck lab predicate medication changes based on results        Type 2 diabetes mellitus without complicationsSounds like his home blood sugars are doing okay.  We will check lab and predicate changes based on results    LABORATORY:  Labs ordered to be performed today include Diabetes Panel 1; CMP, Lipid, A1C.            Orders:       [id number]  DIAB - [facility] LIPID,CMP, A1C: 31490, 49727, 90299  (Send-Out)              Abdominal aortic aneurysm, without ruptureContinue to follow with vascular surgery as recommended        Peripheral vascular disease, unspecifiedContinue to follow with vascular surgery as recommended        Neoplasm of uncertain behavior of left kidneyReviewed the note from urology.        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  The electronic translation of spoken language may permit erroneous, or at times, nonsensical words or phrases to be inadvertently transcribed.  Although I have reviewed the note for such errors, some may still exist.        Charge Capture:         Primary Diagnosis:     Z00.00  Encounter for general adult  medical examination without abnormal findings           Orders:      18811  Preventive medicine, established patient, age 65+ years  (In-House)              Subsequent Annual Well Visit Medicare  (x1)          E78.2  Mixed hyperlipidemia           Orders:      84290-67  Office/outpatient visit; established patient, level 4  (In-House)              E11.9  Type 2 diabetes mellitus without complications     I71.4  Abdominal aortic aneurysm, without rupture     I73.9  Peripheral vascular disease, unspecified     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

## 2021-05-18 NOTE — PROGRESS NOTES
"Judson Ochoa  1945     Office/Outpatient Visit    Visit Date: Fri, Dec 20, 2019 09:56 am    Provider: Jose A Bell MD (Assistant: Silvia Hunter, )    Location: [facility]        Electronically signed by Jose A Bell MD on  01/02/2020 07:07:31 PM                             Subjective:        CC: Mr. Ochoa is a 74 year old White male.  This is a follow-up visit.          HPI:           Mr. Ochoa presents with mixed hyperlipidemia.  Compliance with treatment has been good.  He specifically denies associated symptoms, including muscle pain and weakness.            In regard to the type 2 diabetes mellitus without complications, compliance with treatment has been good.  He specifically denies associated symptoms, including nocturia, polydipsia and polyuria.  He reports home blood glucose readings have been excellent, with average fasting glucoses running <120 mg/dL. He checks his glucose 1 to 2 times daily.        Has hx of Aortic Aneurysm thoracic and abdominal.  He has follow up in February.  He also had some finding noted on the kidney at that time that he says he followed up with the Urologist.  He couldn't remember what they said, but says \"my wife kind of takes care of that\"    ROS:     CONSTITUTIONAL:  Negative for chills, fatigue and fever.      CARDIOVASCULAR:  Negative for chest pain, orthopnea, paroxysmal nocturnal dyspnea and pedal edema.      RESPIRATORY:  Negative for dyspnea and cough.      GASTROINTESTINAL:  Negative for abdominal pain, heartburn, constipation, diarrhea, and stool changes.      GENITOURINARY:  Negative for dysuria and polyuria.      PSYCHIATRIC:  Negative for anxiety and depression.          Past Medical History / Family History / Social History:         Last Reviewed on 12/20/2019 10:35 AM by Jose A Bell    Past Medical History:     UNREMARKABLE         PREVENTIVE HEALTH MAINTENANCE             COLORECTAL CANCER SCREENING: " Up to date (colonoscopy q10y; sigmoidoscopy q5y; Cologuard q3y) was last done 10-05-07, Results are in chart; colonoscopy with normal results; ;10-     DENTAL CLEANING: Over a year.      EYE EXAM: was last done 2019     PSA: was last done 2014 with normal results (3.09)         PAST MEDICAL HISTORY             ADVANCE DIRECTIVE Living will on file         Surgical History:         Other Surgeries:    circumcision ;     Procedures:    Colonoscopy ( /normal )     AAA repair via Endovascular Stenting ;         Family History:     Father: Type 2 Diabetes     Mother:  at age 82; Cause of death was CHF;  Coronary Artery Disease;  Type 2 Diabetes     Brother(s): Cerebrovascular Accident;  Type 1 Diabetes         Social History: enjoys [employer] -     Occupation: Manager of Safety and Staff [employer]     Marital Status:      Children: 1 child         Tobacco/Alcohol/Supplements:     Last Reviewed on 2019 10:00 AM by Silvia Hunter    Tobacco: He has a past history of cigarette smoking; quit date:  2018.          Substance Abuse History:     Last Reviewed on 10/11/2017 12:59 PM by Jose A Bell    None         Mental Health History:     Last Reviewed on 10/11/2017 12:59 PM by Jose A Bell        Communicable Diseases (eg STDs):     Last Reviewed on 10/11/2017 12:59 PM by Jose A Bell        Allergies:     Last Reviewed on 2019 10:00 AM by Silvia Hunter    No Known Allergies.        Current Medications:     Last Reviewed on 2019 10:00 AM by Silvia Hunter    Metformin HCl 500mg Tablet [1 po TID]    Tricor 145mg Tablet [Take 1 tablet(s) by mouth daily]    Sildenafil Citrate 20mg Tablet [Take 1, 2, 3, 4, or 5 tablets 1 hour before sexual activity]    Aspirin (ASA) 81mg Tablets, Enteric Coated [1 tab daily]        Objective:        Vitals:         Current: 2019 10:03:18 AM    Ht:  6 ft, 2.75  in;  Wt: 214 lbs;  BMI: 26.9T: 97.7 F (oral);  BP: 144/68 mm Hg (left arm, sitting);  P: 58 bpm (left arm (BP Cuff), sitting);  sCr: 1.12 mg/dL;  GFR: 65.58        Exams:     PHYSICAL EXAM:     GENERAL:  well developed and nourished; appropriately groomed; in no apparent distress;     EYES: Nonicteric and with unremarkable lids, iris and pupils;     E/N/T:  normal EACs, TMs, nasal/oral mucosa, teeth, gingiva, and oropharynx;     NECK: carotid exam reveals no bruits;     RESPIRATORY: normal respiratory rate and pattern with no distress; normal breath sounds with no rales, rhonchi, wheezes or rubs;     CARDIOVASCULAR: normal rate; rhythm is regular;  no systolic murmur;     GASTROINTESTINAL: nontender, nondistended; no hepatosplenomegaly or masses; no bruits; has a couple scars in groins from his procedure, well healed;     LYMPHATIC: no enlargement of cervical or facial nodes;     MUSCULOSKELETAL: normal overall tone No pedal edema.;     NEUROLOGIC: No lateralizing deficit.;     PSYCHIATRIC:  appropriate affect and demeanor; normal speech pattern; grossly normal memory;     Left foot exam    Protective sensation using Monofilament test: NORMAL sensation. Patient detects .07 grams of force which is considered normal.    Vascular status: normal peripheral vascular exam with palpable dorsal pedal and posterior tibal pulses and brisk digital capillary refill    Skin is intact without sores or ulcers    Right foot exam    Protective sensation using Monofilament test: NORMAL sensation. Patient detects .07 grams of force which is considered normal.    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       D72.819   Decreased white blood cell count, unspecified           ORDERS:         Lab Orders:       [id number]  DIAB1 - [facility] LIPID,CMP, A1C: 13243, 28639, 94688  (Send-Out)            93497  [facility] - [place] CBC with 3 part diff  (Send-Out)              Other Orders:       2028F  Foot examination performed (includes examination through visual inspection, sensory exam with monofilament, and pulse exam - report when any of the three components are completed) (DM)4  (In-House)                      Plan: I could not talk him into taking the flu shot        Mixed hyperlipidemiacontinue Tricor        Type 2 diabetes mellitus without complicationsWill check lab and predicated changes on those results     LABORATORY:  Labs ordered to be performed today include Diabetes Panel 1; CMP, Lipid, A1C.            Orders:       96503  DIAB1 - [facility] LIPID,CMP, A1C: 37990, 50074, 86203  (Send-Out)              Abdominal aortic aneurysm, without ruptureContinue to follow with vascular surgery as directed        Neoplasm of uncertain behavior of left kidneyhe says they did not recommend any further follow up as far as he knows, but says his wife kind of takes care of that        Decreased white blood cell count, unspecifiedcheck follow up CBC    LABORATORY:  Labs ordered to be performed today include CBC.            Orders:       71810  [facility] [place] [facility] with 3 part diff  (Send-Out)                  Other Orders      2028F  Foot examination performed (includes examination through visual inspection, sensory exam with monofilament, and pulse exam - report when any of the three components are completed) (DM)4  (In-House)              Charge Capture:         Primary Diagnosis:     E78.2  Mixed hyperlipidemia           Orders:      81976  Office/outpatient visit; established patient, level 4  (In-House)              E11.9  Type 2 diabetes mellitus without complications     I71.4  Abdominal aortic aneurysm, without  rupture     D41.02  Neoplasm of uncertain behavior of left kidney     D72.819  Decreased white blood cell count, unspecified         Other Orders:       2028F  Foot examination performed (includes examination through visual inspection, sensory exam with monofilament, and pulse exam - report when any of the three components are completed) (DM)4  (In-House)

## 2021-05-18 NOTE — PROGRESS NOTES
Judson Ochoa  1945     Office/Outpatient Visit    Visit Date: Fri, Sep 18, 2020 11:30 am    Provider: Jose A Bell MD (Assistant: Aki Sneed, )    Location: [facility]        Electronically signed by Jose A Bell MD on  09/19/2020 04:13:25 PM                             Subjective:        CC: Mr. Ochoa is a 75 year old White male.  This is a follow-up visit.          HPI:           Patient to be evaluated for mixed hyperlipidemia.  Compliance with treatment has been good.  He specifically denies associated symptoms, including muscle pain and weakness.            With regard to the type 2 diabetes mellitus without complications, compliance with treatment has been good.  He specifically denies associated symptoms, including nocturia, polydipsia and polyuria.  He does not perform home blood glucose monitoring.            PHQ-9 Depression Screening: Completed form scanned and in chart; Total Score 0       He continues to follow with vascular surgery at the [facility] endovascular repair of AAA continued occlusion of the left internal iliac stent distal flow reconstitution it also has TEJINDER chronically occluded with reconstitution distally    ROS:     CONSTITUTIONAL:  Negative for chills, fatigue and fever.      CARDIOVASCULAR:  Negative for chest pain, orthopnea, paroxysmal nocturnal dyspnea and pedal edema.      RESPIRATORY:  Negative for dyspnea and cough.      GASTROINTESTINAL:  Negative for abdominal pain, heartburn, constipation, diarrhea, and stool changes.      GENITOURINARY:  Negative for dysuria and polyuria.      PSYCHIATRIC:  Negative for anxiety and depression.          Past Medical History / Family History / Social History:         Last Reviewed on 12/20/2019 10:35 AM by Jose A Bell    Past Medical History:     UNREMARKABLE         PREVENTIVE HEALTH MAINTENANCE             COLORECTAL CANCER SCREENING: Up to date (colonoscopy q10y;  sigmoidoscopy q5y; Cologuard q3y) was last done 10-05-07, Results are in chart; colonoscopy with normal results; ;10-     DENTAL CLEANING: Over a year.      EYE EXAM: was last done 2019     PSA: was last done 2014 with normal results (3.09)         PAST MEDICAL HISTORY             ADVANCE DIRECTIVE Living will on file         Surgical History:         Other Surgeries:    circumcision ;     Procedures:    Colonoscopy ( /normal )     AAA repair via Endovascular Stenting ;         Family History:     Father: Type 2 Diabetes     Mother:  at age 82; Cause of death was CHF;  Coronary Artery Disease;  Type 2 Diabetes     Brother(s): Cerebrovascular Accident;  Type 1 Diabetes         Social History: enjoys [employer] -     Occupation: Manager of Safety and Staff [employer]     Marital Status:      Children: 1 child         Tobacco/Alcohol/Supplements:     Last Reviewed on 2019 10:00 AM by Silvia Hunter    Tobacco: He has a past history of cigarette smoking; quit date:  2018.          Substance Abuse History:     Last Reviewed on 10/11/2017 12:59 PM by Jose A Bell    None         Mental Health History:     Last Reviewed on 10/11/2017 12:59 PM by Jose A Bell        Communicable Diseases (eg STDs):     Last Reviewed on 10/11/2017 12:59 PM by Jose A Bell        Allergies:     Last Reviewed on 2019 10:00 AM by Silvia Hunter    No Known Allergies.        Current Medications:     Last Reviewed on 2019 10:00 AM by Silvia Hunter    metFORMIN 500 mg oral tablet [TAKE 1 TABLET THREE TIMES A DAY]    Tricor 145 mg oral tablet [TAKE 1 TABLET DAILY]    aspirin 81 mg oral tablet, delayed release (enteric coated) [1 tab daily]    sildenafil (pulm.hypertension) 20 mg oral tablet [TAKE 1, 2, 3, 4, OR 5 TABLETS 1 HOUR BEFORE SEXUAL ACTIVITY]        Objective:        Vitals:         Current: 2020 11:35:43  AM    Ht:  6 ft, 2.75 in;  Wt: 198.4 lbs;  BMI: 25.0T: 96.4 F (temporal);  BP: 122/76 mm Hg (left arm, sitting);  P: 58 bpm (left arm (BP Cuff), sitting);  sCr: 1.03 mg/dL;  GFR: 68.07        Exams:     PHYSICAL EXAM:     GENERAL:  well developed and nourished; appropriately groomed; in no apparent distress;     EYES: Nonicteric and with unremarkable lids, iris and pupils;     E/N/T:  normal EACs, TMs, nasal/oral mucosa, teeth, gingiva, and oropharynx;     NECK: carotid exam reveals no bruits;     RESPIRATORY: normal respiratory rate and pattern with no distress; normal breath sounds with no rales, rhonchi, wheezes or rubs;     CARDIOVASCULAR: normal rate; rhythm is regular;  no systolic murmur;     GASTROINTESTINAL: nontender, nondistended; no hepatosplenomegaly or masses; no bruits; has a couple scars in groins from his procedure, well healed;     LYMPHATIC: no enlargement of cervical or facial nodes;     MUSCULOSKELETAL: normal overall tone No pedal edema.;     NEUROLOGIC: No lateralizing deficit.;     PSYCHIATRIC:  appropriate affect and demeanor; normal speech pattern; grossly normal memory;     Left foot exam    Protective sensation using Monofilament test: NORMAL sensation. Patient detects .07 grams of force which is considered normal.    Vascular status: normal peripheral vascular exam with palpable dorsal pedal and posterior tibal pulses and brisk digital capillary refill    Skin is intact without sores or ulcers    Right foot exam    Protective sensation using Monofilament test: NORMAL sensation. Patient detects .07 grams of force which is considered normal.    Vascular status: normal peripheral vascular exam with palpable dorsal pedal and posterior tibal pulses and brisk digital capillary refill    Skin is intact without sores or ulcers         Assessment:         E78.2   Mixed hyperlipidemia       E11.9   Type 2 diabetes mellitus without complications       Z13.31   Encounter for screening for depression        I71.4   Abdominal aortic aneurysm, without rupture       I73.9   Peripheral vascular disease, unspecified           ORDERS:         Meds Prescribed:       [New Rx] One Touch Test strips  [Use to check BS daily ], #100 (one hundred) strips, Refills: 2 (two)         Lab Orders:       [id number]  DIAB2 - [facility] CMP A1C LIPID AND MICRO ALBUM CR RATIO: 29350,77220,10188,43529,09162  (Send-Out)              Other Orders:         Depression screen negative  (In-House)            2028F  Foot examination performed (includes examination through visual inspection, sensory exam with monofilament, and pulse exam - report when any of the three components are completed) (DM)4  (In-House)                      Plan: declines flu shot        Mixed hyperlipidemiaCheck labs predicate medication changes based on those results        Type 2 diabetes mellitus without complicationsAgain, check lab and predicate medication changes based on results    LABORATORY:  Labs ordered to be performed today include Diabetes Panel 2;CMP, A1C, Lipid, Microalbumin:Creatinine Ratio.            Prescriptions:       [New Rx] One Touch Test strips  [Use to check BS daily ], #100 (one hundred) strips, Refills: 2 (two)           Orders:       81918  DIAB2 - [facility] CMP A1C LIPID AND MICRO ALBUM CR RATIO: 13464,99041,52084,00752,85998  (Send-Out)              Encounter for screening for depression    MIPS Negative Depression Screen           Orders:         Depression screen negative  (In-House)              Abdominal aortic aneurysm, without ruptureFollow-up with vascular surgery as directed        Peripheral vascular disease, unspecifiedRisk factor modification            Other Orders      2028F  Foot examination performed (includes examination through visual inspection, sensory exam with monofilament, and pulse exam - report when any of the three components are completed) (DM)4  (In-House)              Other Patient Education Handouts:     Dragon  transcription disclaimer:        Much of this encounter note is an electronic transcription/translation of spoken language to printed text.  The electronic translation of spoken language may permit erroneous, or at times, nonsensical words or phrases to be inadvertently transcribed.  Although I have reviewed the note for such errors, some may still exist.        Charge Capture:         Primary Diagnosis:     E78.2  Mixed hyperlipidemia           Orders:      46163  Office/outpatient visit; established patient, level 4  (In-House)              E11.9  Type 2 diabetes mellitus without complications     Z13.31  Encounter for screening for depression           Orders:        Depression screen negative  (In-House)              I71.4  Abdominal aortic aneurysm, without rupture     I73.9  Peripheral vascular disease, unspecified         Other Orders:       2028F  Foot examination performed (includes examination through visual inspection, sensory exam with monofilament, and pulse exam - report when any of the three components are completed) (DM)4  (In-House)

## 2021-06-21 ENCOUNTER — OFFICE VISIT (OUTPATIENT)
Dept: FAMILY MEDICINE CLINIC | Age: 76
End: 2021-06-21

## 2021-06-21 VITALS
HEIGHT: 75 IN | BODY MASS INDEX: 26.53 KG/M2 | WEIGHT: 213.4 LBS | DIASTOLIC BLOOD PRESSURE: 72 MMHG | SYSTOLIC BLOOD PRESSURE: 148 MMHG | HEART RATE: 54 BPM | TEMPERATURE: 98.2 F

## 2021-06-21 DIAGNOSIS — G47.33 OSA ON CPAP: ICD-10-CM

## 2021-06-21 DIAGNOSIS — R97.20 PSA ELEVATION: ICD-10-CM

## 2021-06-21 DIAGNOSIS — R20.2 PARESTHESIA OF UPPER EXTREMITY: Primary | ICD-10-CM

## 2021-06-21 DIAGNOSIS — F17.210 CIGARETTE SMOKER: ICD-10-CM

## 2021-06-21 DIAGNOSIS — Z99.89 OSA ON CPAP: ICD-10-CM

## 2021-06-21 DIAGNOSIS — E11.9 TYPE 2 DIABETES MELLITUS WITHOUT COMPLICATION, WITHOUT LONG-TERM CURRENT USE OF INSULIN (HCC): ICD-10-CM

## 2021-06-21 DIAGNOSIS — E78.2 MIXED HYPERLIPIDEMIA: ICD-10-CM

## 2021-06-21 PROBLEM — I73.9 PERIPHERAL VASCULAR DISEASE, UNSPECIFIED: Status: ACTIVE | Noted: 2021-06-21

## 2021-06-21 PROBLEM — I71.40 ABDOMINAL AORTIC ANEURYSM WITHOUT RUPTURE: Status: ACTIVE | Noted: 2021-06-21

## 2021-06-21 PROCEDURE — 99214 OFFICE O/P EST MOD 30 MIN: CPT | Performed by: FAMILY MEDICINE

## 2021-06-21 RX ORDER — FENOFIBRATE 145 MG/1
TABLET ORAL
COMMUNITY
Start: 2021-05-27 | End: 2021-09-13

## 2021-06-21 NOTE — ASSESSMENT & PLAN NOTE
His symptoms seem most consistent with cervical radiculopathy.  We discussed the possible interventions that might be able to provide him relief if his symptoms are severe enough that he wants to pursue that.  He says that it is getting some better lately, so will hold off on further work-up.  However, if worse or persists he can call in and we can set up MRI of C-Spine to see if there may be something amendable to intervention.

## 2021-06-21 NOTE — PROGRESS NOTES
"Chief Complaint  Numbness (\"in right arm, sometimes in left arm\")    Subjective          Judson Ochoa presents to [facility]  History of Present Illness  Two to three months ago cleaned out a line of brush.  Next day his back was hurting a lot could hardly get out of bed.  Went to Chiropractor and got better.  Now he continues to have numbness in right medial hand and forearm, and then pain in upper arm/shoulder blade.  Driving seems to make it worse. Also notices that pain is worse when reaches upward with right arm, or if looks up (extend neck).  His chiropractor had him do some cervical traction (pnuematic expansion).   Reviewed his recent lab work including elevated PSA.  He also asked if we can take care of refilling supplies for his sleep apnea machine    Review of Systems   Constitutional: Negative for chills, fatigue and fever.   Respiratory: Negative for chest tightness, shortness of breath and wheezing.    Cardiovascular: Negative for chest pain and palpitations.   Gastrointestinal: Negative for constipation, diarrhea, nausea and vomiting.   Genitourinary: Negative for dysuria, hematuria and urgency.   Neurological: Negative for weakness and headache.   Psychiatric/Behavioral: Negative for hallucinations.        Objective   Vital Signs:   /72 (BP Location: Right arm, Patient Position: Sitting)   Pulse 54   Temp 98.2 °F (36.8 °C)   Ht 189.9 cm (74.76\")   Wt 96.8 kg (213 lb 6.4 oz)   BMI 26.84 kg/m²     Physical Exam  Constitutional:       Appearance: Normal appearance.   Neck:      Vascular: No carotid bruit.      Comments: Limited right sided flexion and rotation to the right.  Cardiovascular:      Rate and Rhythm: Normal rate and regular rhythm.      Heart sounds: Normal heart sounds. No murmur heard.     Pulmonary:      Effort: No respiratory distress.      Breath sounds: No wheezing or rales.   Musculoskeletal:      Right lower leg: No edema.      Left " lower leg: No edema.   Lymphadenopathy:      Cervical: No cervical adenopathy.   Neurological:      General: No focal deficit present.      Mental Status: He is alert.      Comments: Deep tendon reflexes are diminished bilaterally at the biceps and triceps   Psychiatric:         Attention and Perception: Attention normal.         Mood and Affect: Mood normal.         Speech: Speech normal.            Result Review :   The following data was reviewed by: Jose A Bell MD on 06/21/2021:    PSA Screen (04/12/2021 11:29)  Diabetes Panel 1 (03/22/2021 12:44)                Assessment and Plan    Diagnoses and all orders for this visit:    1. Paresthesia of upper extremity (Primary)  Assessment & Plan:  His symptoms seem most consistent with cervical radiculopathy.  We discussed the possible interventions that might be able to provide him relief if his symptoms are severe enough that he wants to pursue that.  He says that it is getting some better lately, so will hold off on further work-up.  However, if worse or persists he can call in and we can set up MRI of C-Spine to see if there may be something amendable to intervention.       2. Type 2 diabetes mellitus without complication, without long-term current use of insulin (CMS/[facility])  Assessment & Plan:  Reviewed his recent lab work showed good blood sugar control although a little bit higher than it had been traditionally      3. Mixed hyperlipidemia    4. Cigarette smoker    5. EASTON on CPAP  Assessment & Plan:  He asked if I would be able to sign an order for him to get some replacement parts for his sleep apnea machine.  I told him I could provide an order for supplies but not for new machine or pressure settings.      6. PSA elevation  Assessment & Plan:  We reviewed the results of his recent PSA elevation.  Discussed implications.  At this point he prefers just wait another 4 to 6 months to repeat the test before deciding next steps        Follow Up   No  follow-ups on file.  Patient was given instructions and counseling regarding his condition or for health maintenance advice. Please see specific information pulled into the AVS if appropriate.

## 2021-06-21 NOTE — ASSESSMENT & PLAN NOTE
He asked if I would be able to sign an order for him to get some replacement parts for his sleep apnea machine.  I told him I could provide an order for supplies but not for new machine or pressure settings.

## 2021-06-21 NOTE — ASSESSMENT & PLAN NOTE
Reviewed his recent lab work showed good blood sugar control although a little bit higher than it had been traditionally

## 2021-06-21 NOTE — ASSESSMENT & PLAN NOTE
We reviewed the results of his recent PSA elevation.  Discussed implications.  At this point he prefers just wait another 4 to 6 months to repeat the test before deciding next steps

## 2021-07-01 VITALS
BODY MASS INDEX: 26.21 KG/M2 | DIASTOLIC BLOOD PRESSURE: 77 MMHG | WEIGHT: 210.8 LBS | HEIGHT: 75 IN | TEMPERATURE: 97.6 F | SYSTOLIC BLOOD PRESSURE: 140 MMHG | HEART RATE: 60 BPM

## 2021-07-01 VITALS
HEIGHT: 75 IN | BODY MASS INDEX: 27.01 KG/M2 | TEMPERATURE: 97.7 F | HEART RATE: 52 BPM | SYSTOLIC BLOOD PRESSURE: 143 MMHG | DIASTOLIC BLOOD PRESSURE: 84 MMHG | WEIGHT: 217.2 LBS

## 2021-07-01 VITALS
DIASTOLIC BLOOD PRESSURE: 81 MMHG | HEART RATE: 59 BPM | HEIGHT: 75 IN | SYSTOLIC BLOOD PRESSURE: 128 MMHG | BODY MASS INDEX: 26.19 KG/M2 | WEIGHT: 210.6 LBS | TEMPERATURE: 97.3 F

## 2021-07-02 ENCOUNTER — TELEPHONE (OUTPATIENT)
Dept: FAMILY MEDICINE CLINIC | Age: 76
End: 2021-07-02

## 2021-07-02 VITALS
BODY MASS INDEX: 24.67 KG/M2 | HEART RATE: 58 BPM | WEIGHT: 198.4 LBS | DIASTOLIC BLOOD PRESSURE: 76 MMHG | TEMPERATURE: 96.4 F | SYSTOLIC BLOOD PRESSURE: 122 MMHG | HEIGHT: 75 IN

## 2021-07-02 VITALS
HEART RATE: 67 BPM | BODY MASS INDEX: 26.24 KG/M2 | WEIGHT: 211 LBS | HEIGHT: 75 IN | DIASTOLIC BLOOD PRESSURE: 67 MMHG | TEMPERATURE: 96.8 F | SYSTOLIC BLOOD PRESSURE: 126 MMHG

## 2021-07-02 VITALS
TEMPERATURE: 97.7 F | WEIGHT: 214 LBS | DIASTOLIC BLOOD PRESSURE: 68 MMHG | SYSTOLIC BLOOD PRESSURE: 144 MMHG | HEIGHT: 75 IN | HEART RATE: 58 BPM | BODY MASS INDEX: 26.61 KG/M2

## 2021-07-02 NOTE — TELEPHONE ENCOUNTER
Caller: AIDAN     Relationship: Spouse    Best call back number: [phone]    What was the call regarding: PATIENT NEEDS NEW CPAP MASK. COMPANY STATED THAT THEY NEED DR. SEXTON TO FAX OVER THE PRESCRIPTION IN ORDER FOR THE PATIENT TO RECEIVE THE EQUIPMENT.     [facility]   FAX #: [phone]    Do you require a callback: NO

## 2021-07-14 ENCOUNTER — TELEPHONE (OUTPATIENT)
Dept: FAMILY MEDICINE CLINIC | Age: 76
End: 2021-07-14

## 2021-07-14 NOTE — TELEPHONE ENCOUNTER
Caller: AIDAN    Relationship: WIFE    Best call back number: [phone]    What is the best time to reach you: ANYTIME    Who are you requesting to speak with (clinical staff, provider,  specific staff member): DR. SEXTON'S NURSE        What was the call regarding: PATIENT'S WIFE IS WANTING TO DISCUSS UPDATE IN REGARDS TO PATIENT'S CPAP MACHINE.    Do you require a callback: YES

## 2021-07-22 ENCOUNTER — TELEPHONE (OUTPATIENT)
Dept: FAMILY MEDICINE CLINIC | Age: 76
End: 2021-07-22

## 2021-07-22 NOTE — TELEPHONE ENCOUNTER
Caller: AIDAN HARRISON    Relationship: Spouse    Best call back number: [phone]    What is the best time to reach you: ANYTIME     Who are you requesting to speak with (clinical staff, provider,  specific staff member): CLINICAL STAFF WILFRED     Do you know the name of the person who called: AIDAN HARRISON     What was the call regarding: PATIENT IS NEEDING A C-PAC MASK. SHE IS RETURNING A CALL TO WILFRED. PLEASE CALL AND ADVISE.     Do you require a callback: YES

## 2021-07-23 NOTE — TELEPHONE ENCOUNTER
Spoke to Lili, pt needs F2F.  He is seeing MS in Sept and she says that is soon enough.  I instructed her to make sure pt talks to Dr. Bell about the mask at the visit so that its documented

## 2021-09-13 RX ORDER — FENOFIBRATE 145 MG/1
TABLET, COATED ORAL
Qty: 90 TABLET | Refills: 0 | Status: SHIPPED | OUTPATIENT
Start: 2021-09-13 | End: 2021-12-03 | Stop reason: SDUPTHER

## 2021-09-22 ENCOUNTER — OFFICE VISIT (OUTPATIENT)
Dept: FAMILY MEDICINE CLINIC | Age: 76
End: 2021-09-22

## 2021-09-22 ENCOUNTER — LAB (OUTPATIENT)
Dept: LAB | Facility: HOSPITAL | Age: 76
End: 2021-09-22

## 2021-09-22 VITALS
DIASTOLIC BLOOD PRESSURE: 81 MMHG | WEIGHT: 210 LBS | BODY MASS INDEX: 26.11 KG/M2 | HEART RATE: 51 BPM | SYSTOLIC BLOOD PRESSURE: 155 MMHG | HEIGHT: 75 IN | TEMPERATURE: 97.5 F

## 2021-09-22 DIAGNOSIS — G89.29 CHRONIC RIGHT-SIDED LOW BACK PAIN WITH RIGHT-SIDED SCIATICA: ICD-10-CM

## 2021-09-22 DIAGNOSIS — I71.40 ABDOMINAL AORTIC ANEURYSM WITHOUT RUPTURE (HCC): ICD-10-CM

## 2021-09-22 DIAGNOSIS — F17.210 CIGARETTE SMOKER: ICD-10-CM

## 2021-09-22 DIAGNOSIS — E11.9 TYPE 2 DIABETES MELLITUS WITHOUT COMPLICATION, WITHOUT LONG-TERM CURRENT USE OF INSULIN (HCC): Primary | ICD-10-CM

## 2021-09-22 DIAGNOSIS — E78.2 MIXED HYPERLIPIDEMIA: ICD-10-CM

## 2021-09-22 DIAGNOSIS — Z99.89 OSA ON CPAP: ICD-10-CM

## 2021-09-22 DIAGNOSIS — G47.33 OSA ON CPAP: ICD-10-CM

## 2021-09-22 DIAGNOSIS — I73.9 PERIPHERAL VASCULAR DISEASE, UNSPECIFIED (HCC): ICD-10-CM

## 2021-09-22 DIAGNOSIS — Z72.9 PROBLEM RELATED TO LIFESTYLE: ICD-10-CM

## 2021-09-22 DIAGNOSIS — R97.20 PSA ELEVATION: ICD-10-CM

## 2021-09-22 DIAGNOSIS — E11.9 TYPE 2 DIABETES MELLITUS WITHOUT COMPLICATION, WITHOUT LONG-TERM CURRENT USE OF INSULIN (HCC): ICD-10-CM

## 2021-09-22 DIAGNOSIS — M54.41 CHRONIC RIGHT-SIDED LOW BACK PAIN WITH RIGHT-SIDED SCIATICA: ICD-10-CM

## 2021-09-22 LAB
ALBUMIN SERPL-MCNC: 4.9 G/DL (ref 3.5–5.2)
ALBUMIN UR-MCNC: <1.2 MG/DL
ALBUMIN/GLOB SERPL: 2 G/DL
ALP SERPL-CCNC: 46 U/L (ref 39–117)
ALT SERPL W P-5'-P-CCNC: 16 U/L (ref 1–41)
ANION GAP SERPL CALCULATED.3IONS-SCNC: 9.4 MMOL/L (ref 5–15)
AST SERPL-CCNC: 16 U/L (ref 1–40)
BASOPHILS # BLD AUTO: 0.01 10*3/MM3 (ref 0–0.2)
BASOPHILS NFR BLD AUTO: 0.2 % (ref 0–1.5)
BILIRUB SERPL-MCNC: 0.5 MG/DL (ref 0–1.2)
BUN SERPL-MCNC: 17 MG/DL (ref 8–23)
BUN/CREAT SERPL: 14.8 (ref 7–25)
CALCIUM SPEC-SCNC: 9.2 MG/DL (ref 8.6–10.5)
CHLORIDE SERPL-SCNC: 103 MMOL/L (ref 98–107)
CHOLEST SERPL-MCNC: 175 MG/DL (ref 0–200)
CO2 SERPL-SCNC: 26.6 MMOL/L (ref 22–29)
CREAT SERPL-MCNC: 1.15 MG/DL (ref 0.76–1.27)
CREAT UR-MCNC: 38.2 MG/DL
DEPRECATED RDW RBC AUTO: 42.4 FL (ref 37–54)
EOSINOPHIL # BLD AUTO: 0.12 10*3/MM3 (ref 0–0.4)
EOSINOPHIL NFR BLD AUTO: 2.8 % (ref 0.3–6.2)
ERYTHROCYTE [DISTWIDTH] IN BLOOD BY AUTOMATED COUNT: 13.1 % (ref 12.3–15.4)
GFR SERPL CREATININE-BSD FRML MDRD: 62 ML/MIN/1.73
GLOBULIN UR ELPH-MCNC: 2.5 GM/DL
GLUCOSE SERPL-MCNC: 149 MG/DL (ref 65–99)
HBA1C MFR BLD: 7.62 % (ref 4.8–5.6)
HCT VFR BLD AUTO: 42.1 % (ref 37.5–51)
HCV AB SER DONR QL: NORMAL
HDLC SERPL-MCNC: 41 MG/DL (ref 40–60)
HGB BLD-MCNC: 14.5 G/DL (ref 13–17.7)
IMM GRANULOCYTES # BLD AUTO: 0.04 10*3/MM3 (ref 0–0.05)
IMM GRANULOCYTES NFR BLD AUTO: 0.9 % (ref 0–0.5)
LDLC SERPL CALC-MCNC: 98 MG/DL (ref 0–100)
LDLC/HDLC SERPL: 2.26 {RATIO}
LYMPHOCYTES # BLD AUTO: 1.96 10*3/MM3 (ref 0.7–3.1)
LYMPHOCYTES NFR BLD AUTO: 45.9 % (ref 19.6–45.3)
MCH RBC QN AUTO: 30.1 PG (ref 26.6–33)
MCHC RBC AUTO-ENTMCNC: 34.4 G/DL (ref 31.5–35.7)
MCV RBC AUTO: 87.3 FL (ref 79–97)
MICROALBUMIN/CREAT UR: NORMAL MG/G{CREAT}
MONOCYTES # BLD AUTO: 0.5 10*3/MM3 (ref 0.1–0.9)
MONOCYTES NFR BLD AUTO: 11.7 % (ref 5–12)
NEUTROPHILS NFR BLD AUTO: 1.64 10*3/MM3 (ref 1.7–7)
NEUTROPHILS NFR BLD AUTO: 38.5 % (ref 42.7–76)
PLATELET # BLD AUTO: 147 10*3/MM3 (ref 140–450)
PMV BLD AUTO: 9.5 FL (ref 6–12)
POTASSIUM SERPL-SCNC: 4.8 MMOL/L (ref 3.5–5.2)
PROT SERPL-MCNC: 7.4 G/DL (ref 6–8.5)
PSA SERPL-MCNC: 5.1 NG/ML (ref 0–4)
RBC # BLD AUTO: 4.82 10*6/MM3 (ref 4.14–5.8)
SODIUM SERPL-SCNC: 139 MMOL/L (ref 136–145)
TRIGL SERPL-MCNC: 207 MG/DL (ref 0–150)
VLDLC SERPL-MCNC: 36 MG/DL (ref 5–40)
WBC # BLD AUTO: 4.27 10*3/MM3 (ref 3.4–10.8)

## 2021-09-22 PROCEDURE — 83036 HEMOGLOBIN GLYCOSYLATED A1C: CPT

## 2021-09-22 PROCEDURE — 85025 COMPLETE CBC W/AUTO DIFF WBC: CPT

## 2021-09-22 PROCEDURE — 80053 COMPREHEN METABOLIC PANEL: CPT

## 2021-09-22 PROCEDURE — 86803 HEPATITIS C AB TEST: CPT | Performed by: FAMILY MEDICINE

## 2021-09-22 PROCEDURE — 84153 ASSAY OF PSA TOTAL: CPT | Performed by: FAMILY MEDICINE

## 2021-09-22 PROCEDURE — 80061 LIPID PANEL: CPT | Performed by: FAMILY MEDICINE

## 2021-09-22 PROCEDURE — 99214 OFFICE O/P EST MOD 30 MIN: CPT | Performed by: FAMILY MEDICINE

## 2021-09-22 PROCEDURE — 82570 ASSAY OF URINE CREATININE: CPT

## 2021-09-22 PROCEDURE — 36415 COLL VENOUS BLD VENIPUNCTURE: CPT | Performed by: FAMILY MEDICINE

## 2021-09-22 PROCEDURE — 82043 UR ALBUMIN QUANTITATIVE: CPT

## 2021-09-22 NOTE — ASSESSMENT & PLAN NOTE
In general he has done well with his diabetes.  No longer seen diabetic educator.  We will check lab predicate medication change based on results

## 2021-09-22 NOTE — PROGRESS NOTES
"Chief Complaint  Diabetes (6 month follow up)    Subjective          Judson Ochoa presents to [facility]  History of Present Illness  Here today follow-up on his chronic health conditions.  He has diabetes mellitus which generally does pretty good job controlling with medication and diet.  Has an upcoming eye doctor appointment.   Remains fairly active.  Does a lot of hunting.  Does report he is got considerable amount of discomfort in lower back radiating into the right leg.  We discussed possible treatments with gabapentin or epidural injections.  He says his symptoms are bad enough that he would want to pursue that just yet.  Does also have a history of peripheral artery disease status post surgery for aortic aneurysm.  Had a follow-up visit with vascular surgery at [facility] back in April.      Review of Systems   Constitutional: Negative for chills, fatigue and fever.   Respiratory: Negative for chest tightness, shortness of breath and wheezing.    Cardiovascular: Negative for chest pain and palpitations.   Gastrointestinal: Negative for constipation, diarrhea, nausea and vomiting.   Genitourinary: Negative for dysuria, hematuria and urgency.   Neurological: Negative for weakness and headache.   Psychiatric/Behavioral: Negative for hallucinations.        Objective   Vital Signs:   /81 (BP Location: Left arm, Patient Position: Sitting)   Pulse 51   Temp 97.5 °F (36.4 °C) (Oral)   Ht 189.9 cm (74.76\")   Wt 95.3 kg (210 lb)   BMI 26.41 kg/m²     Physical Exam  Constitutional:       Appearance: Normal appearance.   Neck:      Vascular: No carotid bruit.   Cardiovascular:      Rate and Rhythm: Normal rate and regular rhythm.      Heart sounds: Normal heart sounds. No murmur heard.     Pulmonary:      Effort: No respiratory distress.      Breath sounds: No wheezing or rales.   Musculoskeletal:      Right lower leg: No edema.      Left lower leg: No " edema.      Right foot: No deformity.      Left foot: No deformity.   Feet:      Right foot:      Protective Sensation: 5 sites tested. 3 sites sensed.      Skin integrity: No ulcer or callus.      Toenail Condition: Right toenails are normal.      Left foot:      Protective Sensation: 5 sites tested. 5 sites sensed.      Skin integrity: No ulcer or callus.      Toenail Condition: Left toenails are normal.   Lymphadenopathy:      Cervical: No cervical adenopathy.   Neurological:      General: No focal deficit present.      Mental Status: He is alert.   Psychiatric:         Attention and Perception: Attention normal.         Mood and Affect: Mood normal.         Speech: Speech normal.            Result Review :   The following data was reviewed by: Jose A Bell MD on 09/22/2021:  PSA Screen (04/12/2021 11:29)  Diabetes Panel 1 (03/22/2021 12:44)                  Assessment and Plan      I recommended he take the Covid vaccine, but he is not one to take vaccines he says.      Diagnoses and all orders for this visit:    1. Type 2 diabetes mellitus without complication, without long-term current use of insulin (CMS/[facility]) (Primary)  Comments:  Reminded him to see eye doctor.  He says he has upcoming appt soon  Assessment & Plan:  In general he has done well with his diabetes.  No longer seen diabetic educator.  We will check lab predicate medication change based on results    Orders:  -     Hemoglobin A1c; Future  -     Comprehensive Metabolic Panel; Future  -     Microalbumin / Creatinine Urine Ratio - Urine, Clean Catch; Future    2. EASTON on CPAP  Comments:  He needs a new mask for his CPAP.  Did notify him of the recall    3. Mixed hyperlipidemia  Assessment & Plan:  We will check lab predicate medication changes based on results    Orders:  -     Lipid Panel  -     Comprehensive Metabolic Panel; Future    4. Peripheral vascular disease, unspecified (CMS/HCC)  Assessment & Plan:  Encourage smoking cessation.   Continue to follow with vascular surgery as recommended    Orders:  -     CBC Auto Differential; Future    5. Cigarette smoker  Comments:  No interest in quitting  Assessment & Plan:  He is not interested in smoking cessation    Orders:  -     CBC Auto Differential; Future    6. Abdominal aortic aneurysm without rupture (CMS/HCC)  Assessment & Plan:  Currently asymptomatic.  CT scan was stable.  Follow-up with vascular surgery as recommended      7. PSA elevation  Assessment & Plan:  Discussed his elevated PSA and the ambiguity of the test.  We will repeat to evaluate for change    Orders:  -     PSA DIAGNOSTIC    8. Problem related to lifestyle    9. Chronic right-sided low back pain with right-sided sciatica  Assessment & Plan:  Discussed possible approaches for management.  He will let us know if he wants to pursue    Orders:  -     Hepatitis C Antibody      Follow Up   No follow-ups on file.  Patient was given instructions and counseling regarding his condition or for health maintenance advice. Please see specific information pulled into the AVS if appropriate.

## 2021-09-23 DIAGNOSIS — R97.20 PSA ELEVATION: Primary | ICD-10-CM

## 2021-09-23 RX ORDER — CIPROFLOXACIN 500 MG/1
500 TABLET, FILM COATED ORAL 2 TIMES DAILY
Qty: 28 TABLET | Refills: 0 | Status: SHIPPED | OUTPATIENT
Start: 2021-09-23 | End: 2021-11-05

## 2021-09-24 ENCOUNTER — TELEPHONE (OUTPATIENT)
Dept: FAMILY MEDICINE CLINIC | Age: 76
End: 2021-09-24

## 2021-10-12 ENCOUNTER — LAB (OUTPATIENT)
Dept: LAB | Facility: HOSPITAL | Age: 76
End: 2021-10-12

## 2021-10-12 DIAGNOSIS — R97.20 PSA ELEVATION: ICD-10-CM

## 2021-10-12 LAB — PSA SERPL-MCNC: 6.6 NG/ML (ref 0–4)

## 2021-10-12 PROCEDURE — 84153 ASSAY OF PSA TOTAL: CPT

## 2021-10-12 PROCEDURE — 36415 COLL VENOUS BLD VENIPUNCTURE: CPT

## 2021-10-13 ENCOUNTER — TELEPHONE (OUTPATIENT)
Dept: FAMILY MEDICINE CLINIC | Age: 76
End: 2021-10-13

## 2021-10-13 NOTE — TELEPHONE ENCOUNTER
Caller: AIDAN HARRISON     Relationship to patient: Spouse    Best call back number: [phone]     Patient is needing: PATIENT;'S WIFE IS NEEDING A CALL FROM [facility].  SHE WANTS TO DISCUSS ABOUT HER HUSBANDS HEALTH. PLEASE CALL AND ADVISE.

## 2021-10-14 ENCOUNTER — TELEPHONE (OUTPATIENT)
Dept: FAMILY MEDICINE CLINIC | Age: 76
End: 2021-10-14

## 2021-10-14 NOTE — TELEPHONE ENCOUNTER
Hub to read    Theodore from [facility] is requesting copies sleep study, and all recent office notes. Fax [phone] attn theodore.

## 2021-11-05 ENCOUNTER — OFFICE VISIT (OUTPATIENT)
Dept: FAMILY MEDICINE CLINIC | Age: 76
End: 2021-11-05

## 2021-11-05 ENCOUNTER — TELEPHONE (OUTPATIENT)
Dept: FAMILY MEDICINE CLINIC | Age: 76
End: 2021-11-05

## 2021-11-05 VITALS
TEMPERATURE: 97.7 F | DIASTOLIC BLOOD PRESSURE: 94 MMHG | HEART RATE: 53 BPM | SYSTOLIC BLOOD PRESSURE: 180 MMHG | WEIGHT: 211.6 LBS | HEIGHT: 75 IN | OXYGEN SATURATION: 98 % | BODY MASS INDEX: 26.31 KG/M2

## 2021-11-05 DIAGNOSIS — E11.9 TYPE 2 DIABETES MELLITUS WITHOUT COMPLICATION, WITHOUT LONG-TERM CURRENT USE OF INSULIN (HCC): ICD-10-CM

## 2021-11-05 DIAGNOSIS — I10 UNCONTROLLED HYPERTENSION: ICD-10-CM

## 2021-11-05 DIAGNOSIS — I10 HYPERTENSION, ESSENTIAL: ICD-10-CM

## 2021-11-05 DIAGNOSIS — R97.20 PSA ELEVATION: Primary | ICD-10-CM

## 2021-11-05 DIAGNOSIS — E78.2 MIXED HYPERLIPIDEMIA: ICD-10-CM

## 2021-11-05 DIAGNOSIS — I73.9 PERIPHERAL VASCULAR DISEASE, UNSPECIFIED (HCC): Primary | ICD-10-CM

## 2021-11-05 DIAGNOSIS — I73.9 PERIPHERAL VASCULAR DISEASE, UNSPECIFIED (HCC): ICD-10-CM

## 2021-11-05 PROCEDURE — 99214 OFFICE O/P EST MOD 30 MIN: CPT | Performed by: FAMILY MEDICINE

## 2021-11-05 RX ORDER — AMLODIPINE BESYLATE 5 MG/1
5 TABLET ORAL DAILY
Qty: 30 TABLET | Refills: 0 | Status: SHIPPED | OUTPATIENT
Start: 2021-11-05 | End: 2021-11-30 | Stop reason: SDUPTHER

## 2021-11-05 NOTE — TELEPHONE ENCOUNTER
Call his vascular surgeon office at  and let them know I have a suspicion he may have renal artery stenosis.  Ask if they have a preference for CTA versus ultrasound/Doppler or other modality for screening for BECK.  Prefers to do the study here locally

## 2021-11-05 NOTE — TELEPHONE ENCOUNTER
Spoke to reception and Dr. Ruvalcaba is out until Monday, she will get a message to him and call me back

## 2021-11-05 NOTE — PROGRESS NOTES
Chief Complaint  Elevated PSA (discuss )    Subjective          Judson Ochoa presents to [facility]  History of Present Illness    Here today with his wife. Came in to review the recent lab results which showed elevated PSA spike treatment with antibiotics. Had a long and thorough discussion about prostate cancer screening, false positives, low risk of death from prostate cancer in most men, uncertainty of treatment of prostate cancer. Discussed seeing the urologist for further evaluation and discussion of options and recommendation. At this point they say they would rather just hold off a little bit longer repeat the PSA 1 more time. He prefers to do it at the end of February so that it does not interrupt his rabbit hunting.  While here noted that his blood pressure was significantly elevated. Was also elevated at last visit. Traditionally his blood pressures have been good. With his history of peripheral vascular disease and his history of aortic aneurysm and iliac artery stenosis/stent placement certainly possible he may have developed renal artery stenosis as well.    Current Outpatient Medications   Medication Sig Dispense Refill   • fenofibrate (TRICOR) 145 MG tablet TAKE 1 TABLET DAILY 90 tablet 0   • metFORMIN (GLUCOPHAGE) 500 MG tablet TAKE 1 TABLET THREE TIMES A  tablet 0   • amLODIPine (NORVASC) 5 MG tablet Take 1 tablet by mouth Daily. 30 tablet 0     No current facility-administered medications for this visit.       Review of Systems   Constitutional: Negative for chills, fatigue and fever.   Respiratory: Negative for chest tightness, shortness of breath and wheezing.    Cardiovascular: Negative for chest pain and palpitations.   Gastrointestinal: Negative for constipation, diarrhea, nausea and vomiting.   Genitourinary: Negative for dysuria, hematuria and urgency.   Neurological: Negative for weakness and headache.   Psychiatric/Behavioral: Negative for  "hallucinations.            Objective   Vital Signs:   /94 (BP Location: Right arm, Patient Position: Sitting, Cuff Size: Adult) Comment: by richi  Pulse 53   Temp 97.7 °F (36.5 °C)   Ht 189.9 cm (74.76\")   Wt 96 kg (211 lb 9.6 oz)   SpO2 98%   BMI 26.62 kg/m²     Physical Exam  Constitutional:       Appearance: Normal appearance.   Neck:      Vascular: No carotid bruit.   Cardiovascular:      Rate and Rhythm: Normal rate and regular rhythm.      Heart sounds: Normal heart sounds. No murmur heard.      Pulmonary:      Effort: No respiratory distress.      Breath sounds: No wheezing or rales.   Musculoskeletal:      Right lower leg: No edema.      Left lower leg: No edema.   Lymphadenopathy:      Cervical: No cervical adenopathy.   Neurological:      General: No focal deficit present.      Mental Status: He is alert.   Psychiatric:         Attention and Perception: Attention normal.         Mood and Affect: Mood normal.         Speech: Speech normal.            Result Review :                     Assessment and Plan    Diagnoses and all orders for this visit:    1. PSA elevation (Primary)  Comments:  At this point we will plan on repeating his PSA in February  Orders:  -     PSA DIAGNOSTIC; Future    2. Type 2 diabetes mellitus without complication, without long-term current use of insulin (HCC)  Comments:  We will go ahead and place orders for labs to be drawn at time of his repeat PSA  Orders:  -     Hemoglobin A1c; Future  -     Comprehensive Metabolic Panel; Future    3. Mixed hyperlipidemia  Comments:  Continue on his current regimen and check his labs in February  Orders:  -     Lipid Panel; Future  -     Comprehensive Metabolic Panel; Future    4. Peripheral vascular disease, unspecified (HCC)  Comments:  Will get in touch with his vascular surgeon and see if they have a preference for CTA versus ultrasound/Doppler of the renal artery  Orders:  -     CBC Auto Differential; Future    5. " Hypertension, essential  Comments:  Since I have some concerns about BECK we will avoid ACE inhibitor for now.  Initiate amlodipine.  Return in 1 week for blood pressure check  Orders:  -     CBC Auto Differential; Future  -     amLODIPine (NORVASC) 5 MG tablet; Take 1 tablet by mouth Daily.  Dispense: 30 tablet; Refill: 0        Follow Up   No follow-ups on file.  Patient was given instructions and counseling regarding his condition or for health maintenance advice. Please see specific information pulled into the AVS if appropriate.

## 2021-11-17 NOTE — TELEPHONE ENCOUNTER
I do not see that we have heard back from the vascular surgeons office.  Please see if we can get a recommendation from them out the best way they would like for us to look at his renal artery.  Perhaps they would prefer to see the patient in person.

## 2021-11-23 ENCOUNTER — CLINICAL SUPPORT (OUTPATIENT)
Dept: FAMILY MEDICINE CLINIC | Age: 76
End: 2021-11-23

## 2021-11-23 VITALS — DIASTOLIC BLOOD PRESSURE: 79 MMHG | SYSTOLIC BLOOD PRESSURE: 138 MMHG | HEART RATE: 65 BPM

## 2021-11-30 ENCOUNTER — TELEPHONE (OUTPATIENT)
Dept: FAMILY MEDICINE CLINIC | Age: 76
End: 2021-11-30

## 2021-11-30 DIAGNOSIS — I10 HYPERTENSION, ESSENTIAL: ICD-10-CM

## 2021-11-30 RX ORDER — AMLODIPINE BESYLATE 5 MG/1
5 TABLET ORAL DAILY
Qty: 30 TABLET | Refills: 0 | Status: SHIPPED | OUTPATIENT
Start: 2021-11-30 | End: 2022-01-04

## 2021-11-30 NOTE — TELEPHONE ENCOUNTER
Caller: AIDAN MARCH    Relationship: Emergency Contact    Best call back number: [phone]    What is the best time to reach you: ANYTIME    Who are you requesting to speak with (clinical staff, provider,  specific staff member): DR SEXTON STAFF    Do you know the name of the person who called:     What was the call regarding: PATIENTS WIFE WOULD LIKE A CALL BACK ABOUT THIS PATIENT BLOOD PRESSURE MEDICATIONS. PLEASE CALL AND ADVISE.     Do you require a callback: YES

## 2021-12-03 ENCOUNTER — TELEPHONE (OUTPATIENT)
Dept: FAMILY MEDICINE CLINIC | Age: 76
End: 2021-12-03

## 2021-12-03 RX ORDER — FENOFIBRATE 145 MG/1
145 TABLET, COATED ORAL DAILY
Qty: 90 TABLET | Refills: 0 | Status: SHIPPED | OUTPATIENT
Start: 2021-12-03 | End: 2022-03-01

## 2021-12-03 NOTE — TELEPHONE ENCOUNTER
Caller: AIDAN    Relationship: Spouse    Best call back number: [phone]    What is the best time to reach you: ANYTIME    Who are you requesting to speak with (clinical staff, provider,  specific staff member): CLINICAL        What was the call regarding: FOR RIGHT NOW THE PATIENT WOULD LIKE HIS PRIMARY PHARMACY TO BE CHANGED TO    [facility]   [address] [address] [address], [place] [address] [phone]      Do you require a callback: NO BUT WIFE IS REQUESTING THAT YOU SEND PATIENT AN EMAIL AND JUST LET HIM KNOW IT HAS BEEN SWITCHED      ALSO PLEASE KEEP [facility] IN THE PATIENT'S PROFILE.

## 2021-12-03 NOTE — TELEPHONE ENCOUNTER
Caller: AIDAN HARRISON    Relationship: Spouse    Best call back number: [phone]    Requested Prescriptions:   Requested Prescriptions     Pending Prescriptions Disp Refills   • fenofibrate (TRICOR) 145 MG tablet 90 tablet 0     Sig: Take 1 tablet by mouth Daily.   • metFORMIN (GLUCOPHAGE) 500 MG tablet 270 tablet 0     Sig: Take 1 tablet by mouth 3 (Three) Times a Day.        Pharmacy where request should be sent: [id number] - [address] AT [address] [address] [address] [address] - [phone] [facility] [phone] FX       Does the patient have less than a 3 day supply:  [] Yes  [x] No    Hunter Anthony Rep   12/03/21 09:36 EST

## 2021-12-22 ENCOUNTER — TELEPHONE (OUTPATIENT)
Dept: FAMILY MEDICINE CLINIC | Age: 76
End: 2021-12-22

## 2021-12-22 NOTE — TELEPHONE ENCOUNTER
Caller: AIDAN HARRISON    Relationship: Spouse    Best call back number: [phone]    What is the best time to reach you: ANYTIME    Who are you requesting to speak with (clinical staff, provider,  specific staff member): WILFRED     What was the call regarding: THEY WERE ASKING ABOUT RESULTS FROM HIS REFERRAL TO [facility].     Do you require a callback: PLEASE ADVISE

## 2021-12-24 NOTE — TELEPHONE ENCOUNTER
The ultrasound test results do not reveal evidence of blockage within the renal artery to explain the elevated blood pressure.  If you have been monitoring your blood pressure at home send us a log of those readings please.    [facility]  Victorino Bell MD

## 2021-12-27 NOTE — TELEPHONE ENCOUNTER
Spoke to wife, she says they have been checking it, but not writing it down, she will start checking and writing it down and send a log, but she says that its been doing good

## 2022-01-03 DIAGNOSIS — I10 HYPERTENSION, ESSENTIAL: ICD-10-CM

## 2022-01-04 RX ORDER — AMLODIPINE BESYLATE 5 MG/1
5 TABLET ORAL DAILY
Qty: 90 TABLET | Refills: 0 | Status: SHIPPED | OUTPATIENT
Start: 2022-01-04 | End: 2022-03-31

## 2022-02-23 ENCOUNTER — TELEPHONE (OUTPATIENT)
Dept: FAMILY MEDICINE CLINIC | Age: 77
End: 2022-02-23

## 2022-02-23 RX ORDER — SILDENAFIL CITRATE 20 MG/1
20 TABLET ORAL 3 TIMES DAILY
COMMUNITY

## 2022-03-01 RX ORDER — FENOFIBRATE 145 MG/1
145 TABLET, COATED ORAL DAILY
Qty: 90 TABLET | Refills: 0 | Status: SHIPPED | OUTPATIENT
Start: 2022-03-01 | End: 2022-06-13

## 2022-03-02 ENCOUNTER — TELEPHONE (OUTPATIENT)
Dept: FAMILY MEDICINE CLINIC | Age: 77
End: 2022-03-02

## 2022-03-31 DIAGNOSIS — I10 HYPERTENSION, ESSENTIAL: ICD-10-CM

## 2022-03-31 RX ORDER — AMLODIPINE BESYLATE 5 MG/1
5 TABLET ORAL DAILY
Qty: 90 TABLET | Refills: 0 | Status: SHIPPED | OUTPATIENT
Start: 2022-03-31 | End: 2022-06-13

## 2022-04-19 ENCOUNTER — LAB (OUTPATIENT)
Dept: LAB | Facility: HOSPITAL | Age: 77
End: 2022-04-19

## 2022-04-19 DIAGNOSIS — E78.2 MIXED HYPERLIPIDEMIA: ICD-10-CM

## 2022-04-19 DIAGNOSIS — I10 HYPERTENSION, ESSENTIAL: ICD-10-CM

## 2022-04-19 DIAGNOSIS — I73.9 PERIPHERAL VASCULAR DISEASE, UNSPECIFIED: ICD-10-CM

## 2022-04-19 DIAGNOSIS — E11.9 TYPE 2 DIABETES MELLITUS WITHOUT COMPLICATION, WITHOUT LONG-TERM CURRENT USE OF INSULIN: ICD-10-CM

## 2022-04-19 DIAGNOSIS — R97.20 PSA ELEVATION: ICD-10-CM

## 2022-04-19 LAB
ALBUMIN SERPL-MCNC: 4.6 G/DL (ref 3.5–5.2)
ALBUMIN/GLOB SERPL: 1.9 G/DL
ALP SERPL-CCNC: 46 U/L (ref 39–117)
ALT SERPL W P-5'-P-CCNC: 21 U/L (ref 1–41)
ANION GAP SERPL CALCULATED.3IONS-SCNC: 10.6 MMOL/L (ref 5–15)
AST SERPL-CCNC: 17 U/L (ref 1–40)
BASOPHILS # BLD AUTO: 0.02 10*3/MM3 (ref 0–0.2)
BASOPHILS NFR BLD AUTO: 0.6 % (ref 0–1.5)
BILIRUB SERPL-MCNC: 0.4 MG/DL (ref 0–1.2)
BUN SERPL-MCNC: 16 MG/DL (ref 8–23)
BUN/CREAT SERPL: 14.7 (ref 7–25)
CALCIUM SPEC-SCNC: 9.4 MG/DL (ref 8.6–10.5)
CHLORIDE SERPL-SCNC: 104 MMOL/L (ref 98–107)
CHOLEST SERPL-MCNC: 184 MG/DL (ref 0–200)
CO2 SERPL-SCNC: 25.4 MMOL/L (ref 22–29)
CREAT SERPL-MCNC: 1.09 MG/DL (ref 0.76–1.27)
DEPRECATED RDW RBC AUTO: 42.5 FL (ref 37–54)
EGFRCR SERPLBLD CKD-EPI 2021: 71.2 ML/MIN/1.73
EOSINOPHIL # BLD AUTO: 0.1 10*3/MM3 (ref 0–0.4)
EOSINOPHIL NFR BLD AUTO: 2.9 % (ref 0.3–6.2)
ERYTHROCYTE [DISTWIDTH] IN BLOOD BY AUTOMATED COUNT: 13.2 % (ref 12.3–15.4)
GLOBULIN UR ELPH-MCNC: 2.4 GM/DL
GLUCOSE SERPL-MCNC: 170 MG/DL (ref 65–99)
HBA1C MFR BLD: 7.4 % (ref 4.8–5.6)
HCT VFR BLD AUTO: 42.2 % (ref 37.5–51)
HDLC SERPL-MCNC: 39 MG/DL (ref 40–60)
HGB BLD-MCNC: 14.2 G/DL (ref 13–17.7)
IMM GRANULOCYTES # BLD AUTO: 0.02 10*3/MM3 (ref 0–0.05)
IMM GRANULOCYTES NFR BLD AUTO: 0.6 % (ref 0–0.5)
LDLC SERPL CALC-MCNC: 105 MG/DL (ref 0–100)
LDLC/HDLC SERPL: 2.52 {RATIO}
LYMPHOCYTES # BLD AUTO: 1.59 10*3/MM3 (ref 0.7–3.1)
LYMPHOCYTES NFR BLD AUTO: 46.9 % (ref 19.6–45.3)
MCH RBC QN AUTO: 29.6 PG (ref 26.6–33)
MCHC RBC AUTO-ENTMCNC: 33.6 G/DL (ref 31.5–35.7)
MCV RBC AUTO: 88.1 FL (ref 79–97)
MONOCYTES # BLD AUTO: 0.47 10*3/MM3 (ref 0.1–0.9)
MONOCYTES NFR BLD AUTO: 13.9 % (ref 5–12)
NEUTROPHILS NFR BLD AUTO: 1.19 10*3/MM3 (ref 1.7–7)
NEUTROPHILS NFR BLD AUTO: 35.1 % (ref 42.7–76)
PLATELET # BLD AUTO: 176 10*3/MM3 (ref 140–450)
PMV BLD AUTO: 10 FL (ref 6–12)
POTASSIUM SERPL-SCNC: 4.7 MMOL/L (ref 3.5–5.2)
PROT SERPL-MCNC: 7 G/DL (ref 6–8.5)
PSA SERPL-MCNC: 4.97 NG/ML (ref 0–4)
RBC # BLD AUTO: 4.79 10*6/MM3 (ref 4.14–5.8)
SODIUM SERPL-SCNC: 140 MMOL/L (ref 136–145)
TRIGL SERPL-MCNC: 234 MG/DL (ref 0–150)
VLDLC SERPL-MCNC: 40 MG/DL (ref 5–40)
WBC NRBC COR # BLD: 3.39 10*3/MM3 (ref 3.4–10.8)

## 2022-04-19 PROCEDURE — 80061 LIPID PANEL: CPT

## 2022-04-19 PROCEDURE — 83036 HEMOGLOBIN GLYCOSYLATED A1C: CPT

## 2022-04-19 PROCEDURE — 85025 COMPLETE CBC W/AUTO DIFF WBC: CPT

## 2022-04-19 PROCEDURE — 84153 ASSAY OF PSA TOTAL: CPT

## 2022-04-19 PROCEDURE — 36415 COLL VENOUS BLD VENIPUNCTURE: CPT

## 2022-04-19 PROCEDURE — 80053 COMPREHEN METABOLIC PANEL: CPT

## 2022-04-22 ENCOUNTER — OFFICE VISIT (OUTPATIENT)
Dept: FAMILY MEDICINE CLINIC | Age: 77
End: 2022-04-22

## 2022-04-22 VITALS
WEIGHT: 208 LBS | HEIGHT: 75 IN | BODY MASS INDEX: 25.86 KG/M2 | DIASTOLIC BLOOD PRESSURE: 76 MMHG | TEMPERATURE: 97.9 F | HEART RATE: 77 BPM | SYSTOLIC BLOOD PRESSURE: 121 MMHG | OXYGEN SATURATION: 96 %

## 2022-04-22 DIAGNOSIS — I73.9 PERIPHERAL VASCULAR DISEASE, UNSPECIFIED: ICD-10-CM

## 2022-04-22 DIAGNOSIS — G89.29 CHRONIC MIDLINE LOW BACK PAIN, UNSPECIFIED WHETHER SCIATICA PRESENT: ICD-10-CM

## 2022-04-22 DIAGNOSIS — R97.20 PSA ELEVATION: ICD-10-CM

## 2022-04-22 DIAGNOSIS — I10 HYPERTENSION, ESSENTIAL: ICD-10-CM

## 2022-04-22 DIAGNOSIS — F17.210 CIGARETTE SMOKER: ICD-10-CM

## 2022-04-22 DIAGNOSIS — E11.9 TYPE 2 DIABETES MELLITUS WITHOUT COMPLICATION, WITHOUT LONG-TERM CURRENT USE OF INSULIN: ICD-10-CM

## 2022-04-22 DIAGNOSIS — E78.2 MIXED HYPERLIPIDEMIA: ICD-10-CM

## 2022-04-22 DIAGNOSIS — I71.40 ABDOMINAL AORTIC ANEURYSM WITHOUT RUPTURE: ICD-10-CM

## 2022-04-22 DIAGNOSIS — M54.50 CHRONIC MIDLINE LOW BACK PAIN, UNSPECIFIED WHETHER SCIATICA PRESENT: ICD-10-CM

## 2022-04-22 DIAGNOSIS — Z12.11 COLON CANCER SCREENING: ICD-10-CM

## 2022-04-22 DIAGNOSIS — Z00.00 MEDICARE ANNUAL WELLNESS VISIT, SUBSEQUENT: Primary | ICD-10-CM
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
## 2022-04-22 PROBLEM — Z91.81 HISTORY OF FALL: Status: ACTIVE | Noted: 2022-04-22

## 2022-04-22 PROCEDURE — 1160F RVW MEDS BY RX/DR IN RCRD: CPT | Performed by: FAMILY MEDICINE

## 2022-04-22 PROCEDURE — 1170F FXNL STATUS ASSESSED: CPT | Performed by: FAMILY MEDICINE

## 2022-04-22 PROCEDURE — 99214 OFFICE O/P EST MOD 30 MIN: CPT | Performed by: FAMILY MEDICINE

## 2022-04-22 PROCEDURE — G0439 PPPS, SUBSEQ VISIT: HCPCS | Performed by: FAMILY MEDICINE

## 2022-04-22 PROCEDURE — 1125F AMNT PAIN NOTED PAIN PRSNT: CPT | Performed by: FAMILY MEDICINE

## 2022-04-22 PROCEDURE — 1126F AMNT PAIN NOTED NONE PRSNT: CPT | Performed by: FAMILY MEDICINE

## 2022-04-22 PROCEDURE — 1159F MED LIST DOCD IN RCRD: CPT | Performed by: FAMILY MEDICINE

## 2022-04-22 NOTE — PROGRESS NOTES
"The ABCs of the Annual Wellness Visit  Subsequent Medicare Wellness Visit    Chief Complaint   Patient presents with   • Medicare Wellness-subsequent   • Hypertension   • Hyperlipidemia   •  PSA elevation    • Diabetes   • Peripheral Vascular Disease      Subjective    History of Present Illness:  Judson Ochoa is a 76 y.o. male who presents for a Subsequent Medicare Wellness Visit. Is accompanied by his wife.  CHRISTINE      Blurry vision.   The patient reports that he is going to schedule an eye appointment because his vision is becoming blurry from his cataracts He had an eye appointment 11/2021 and there was no abnormalities. He started noticing the blurry vision 02/2022.     Hypertension.   The accompanied female reports that she bought a blood pressure cuff, but she does not believe it is accurate. He states that he will check his blood pressure and the systolic will be 180 When his blood pressure is checked 5 minutes later, his systolic will be 140 and 15 minutes later it will be 130.     Health maintenance.  The patient reports that he eats lard 2 times a month. He does not plan on quitting smoking. He has not had his COVID-19 vaccines. He confirms that he has not had a colonoscopy since 2007. Denies being admitted to the hospital within the last year. He does have a living will.     Back pain.   The patient reports that he continues to have back pain from 2 disc herniation. He states that he will have muscle spasms in his bilateral calves that make it difficult to walk. He states that resting and \"straightening up\" helps relieve his muscle spasms, but he will start expeirencing them again when he moves. The patient reports that he will experience radiating pain down his bilateral lower extremities and buttocks when he wears tennis shoes. He states that he had to stop 2 times while walking to his truck the other day. He states that he fell while he was hunting during the winter and then he jumped over a ditch " "causing him to slip 1 to 2 weeks later. He started experiencing back pain afterwards. He does have known PAD and hasn't followed up with Vasc Surg for a while.     Diabetes.   He will check his blood sugars a couple times a month.     Right knee edema.   The patient reports that his right knee will start to \"pop\" and have edema.         The following portions of the patient's history were reviewed and   updated as appropriate: allergies, current medications, past family history, past medical history, past social history, past surgical history and problem list.    Compared to one year ago, the patient feels his physical   health is the same.    Compared to one year ago, the patient feels his mental   health is the same.    Recent Hospitalizations:  He was not admitted to the hospital during the last year.   Chronic/Acute Health Issues:        Current Medical Providers:  Patient Care Team:  Jose A Bell MD as PCP - General (Family Medicine)  Angel Ruvalcaba MD (Vascular Surgery)    Outpatient Medications Prior to Visit   Medication Sig Dispense Refill   • amLODIPine (NORVASC) 5 MG tablet TAKE 1 TABLET BY MOUTH DAILY 90 tablet 0   • fenofibrate (TRICOR) 145 MG tablet TAKE 1 TABLET BY MOUTH DAILY 90 tablet 0   • metFORMIN (GLUCOPHAGE) 500 MG tablet TAKE 1 TABLET BY MOUTH THREE TIMES DAILY 270 tablet 0   • sildenafil (REVATIO) 20 MG tablet Take 20 mg by mouth 3 (Three) Times a Day.       No facility-administered medications prior to visit.       No opioid medication identified on active medication list. I have reviewed chart for other potential  high risk medication/s and harmful drug interactions in the elderly.          Aspirin is not on active medication list.  Aspirin use is not indicated based on review of current medical condition/s. Risk of harm outweighs potential benefits.  .    Patient Active Problem List   Diagnosis   • Cigarette smoker   • Abdominal aortic aneurysm without rupture (HCC)   • " "Peripheral vascular disease, unspecified (HCC)   • Type 2 diabetes mellitus without complication, without long-term current use of insulin (HCC)   • Mixed hyperlipidemia   • Paresthesia of upper extremity   • EASTON on CPAP   • PSA elevation   • Chronic midline low back pain   • Obstructive sleep apnea (adult) (pediatric)   • Hypertension, essential   • Medicare annual wellness visit, subsequent   • History of fall     Advance Care Planning  Advance Directive is not on file.  ACP discussion was held with the patient during this visit. Patient has an advance directive (not in EMR), copy requested.          Objective    Vitals:    04/22/22 1323 04/22/22 1333   BP: 144/76 121/76   BP Location: Left arm Left arm   Patient Position: Sitting Sitting   Cuff Size: Adult Adult   Pulse: 76 77   Temp: 97.9 °F (36.6 °C)    TempSrc: Oral    SpO2: 96%  Comment: Room air    Weight: 94.3 kg (208 lb)    Height: 189.9 cm (74.76\")      BMI Readings from Last 1 Encounters:   04/22/22 26.17 kg/m²   BMI is above normal parameters. Recommendations include: nutrition counseling    Does the patient have evidence of cognitive impairment? No    Physical Exam  Vitals and nursing note reviewed.   Constitutional:       General: He is not in acute distress.     Appearance: Normal appearance. He is obese.   HENT:      Right Ear: Tympanic membrane and ear canal normal.      Left Ear: Tympanic membrane and ear canal normal.      Mouth/Throat:      Mouth: Mucous membranes are moist.      Pharynx: Oropharynx is clear. No oropharyngeal exudate.   Eyes:      General: No scleral icterus.     Conjunctiva/sclera: Conjunctivae normal.   Neck:      Vascular: No carotid bruit.   Cardiovascular:      Rate and Rhythm: Normal rate and regular rhythm.      Heart sounds: No murmur heard.    No friction rub. No gallop.   Pulmonary:      Effort: No respiratory distress.      Breath sounds: No wheezing or rales.   Abdominal:      General: Bowel sounds are normal.      " Palpations: Abdomen is soft. There is no mass.      Tenderness: There is no abdominal tenderness. There is no guarding or rebound.   Musculoskeletal:         General: No swelling.      Right lower leg: No edema.      Left lower leg: No edema.   Lymphadenopathy:      Cervical: No cervical adenopathy.   Skin:     Coloration: Skin is not jaundiced.      Findings: No lesion.   Neurological:      General: No focal deficit present.      Mental Status: He is alert and oriented to person, place, and time.   Psychiatric:         Mood and Affect: Mood normal.         Behavior: Behavior normal.         Thought Content: Thought content normal.         Judgment: Judgment normal.        Few varicose veins medical malleolus area      Lab Results   Component Value Date    TRIG 234 (H) 04/19/2022    HDL 39 (L) 04/19/2022     (H) 04/19/2022    VLDL 40 04/19/2022    HGBA1C 7.40 (H) 04/19/2022   CBC Auto Differential (04/19/2022 08:12)  Comprehensive Metabolic Panel (04/19/2022 08:12)  Hemoglobin A1c (04/19/2022 08:12)  Lipid Panel (04/19/2022 08:12)  PSA DIAGNOSTIC (04/19/2022 08:12)           HEALTH RISK ASSESSMENT    Smoking Status:  Social History     Tobacco Use   Smoking Status Current Every Day Smoker   • Packs/day: 1.00   • Years: 60.00   • Pack years: 60.00   Smokeless Tobacco Never Used     Alcohol Consumption:  Social History     Substance and Sexual Activity   Alcohol Use Yes    Comment: occasional     Fall Risk Screen:    STEADI Fall Risk Assessment was completed, and patient is at LOW risk for falls.Assessment completed on:4/22/2022    Depression Screening:  PHQ-2/PHQ-9 Depression Screening 4/22/2022   Retired PHQ-9 Total Score -   Retired Total Score -   Little Interest or Pleasure in Doing Things 0-->not at all   Feeling Down, Depressed or Hopeless 0-->not at all   PHQ-9: Brief Depression Severity Measure Score 0       Health Habits and Functional and Cognitive Screening:  Functional & Cognitive Status 4/22/2022    Do you have difficulty preparing food and eating? No   Do you have difficulty bathing yourself, getting dressed or grooming yourself? No   Do you have difficulty using the toilet? No   Do you have difficulty moving around from place to place? No   Do you have trouble with steps or getting out of a bed or a chair? Yes   Current Diet Diabetic Diet   Dental Exam Up to date   Eye Exam Up to date   Exercise (times per week) 0 times per week   Current Exercises Include No Regular Exercise   Do you need help using the phone?  No   Are you deaf or do you have serious difficulty hearing?  Yes   Do you need help with transportation? No   Do you need help shopping? No   Do you need help preparing meals?  No   Do you need help with housework?  No   Do you need help with laundry? No   Do you need help taking your medications? No   Do you need help managing money? No   Do you ever drive or ride in a car without wearing a seat belt? No   Have you felt unusual stress, anger or loneliness in the last month? No   Who do you live with? Spouse   Have you been bothered in the last four weeks by sexual problems? No   Do you have difficulty concentrating, remembering or making decisions? No       Age-appropriate Screening Schedule:  Refer to the list below for future screening recommendations based on patient's age, sex and/or medical conditions. Orders for these recommended tests are listed in the plan section. The patient has been provided with a written plan.    Health Maintenance   Topic Date Due   • ZOSTER VACCINE (1 of 2) Never done   • TDAP/TD VACCINES (3 - Td or Tdap) 03/31/2022   • INFLUENZA VACCINE  08/01/2022   • URINE MICROALBUMIN  09/22/2022   • HEMOGLOBIN A1C  10/19/2022   • DIABETIC EYE EXAM  11/06/2022   • LIPID PANEL  04/19/2023   • DIABETIC FOOT EXAM  04/22/2023              Assessment/Plan   CMS Preventative Services Quick Reference  Risk Factors Identified During Encounter  Obesity/Overweight   The above  risks/problems have been discussed with the patient.  Follow up actions/plans if indicated are seen below in the Assessment/Plan Section.  Pertinent information has been shared with the patient in the After Visit Summary.    Diagnoses and all orders for this visit:    1. Medicare annual wellness visit, subsequent (Primary)  Assessment & Plan:  We reviewed the preventive service recommendations and created an individualized handout      2. Type 2 diabetes mellitus without complication, without long-term current use of insulin (HCC)  Comments:  His blood sugars could be a little bit better but overall I am satisfied.  Continue on current regimen    3. Mixed hyperlipidemia  Comments:  Labs look okay continue current regimen  Assessment & Plan:  Recommended exercise and eating a healthy low fat diet.       4. Peripheral vascular disease, unspecified (HCC)  Comments:  I do recommend he consider repeat evaluation at  since he has some claudication type symptoms.  They will let me know    5. Abdominal aortic aneurysm without rupture (HCC)  Comments:  Again, would be good to follow-up with the vascular folks at     6. Cigarette smoker  Assessment & Plan:  Told him I be willing to work with him for smoking cessation.  Also recommended low-dose CT scan and explained rationale.  They will consider.  Recommend they go home do some further research online      7. Chronic midline low back pain, unspecified whether sciatica present  Comments:  Does have some claudication symptoms which could represent spinal stenosis.  Discussed option of further work-up.    8. PSA elevation  Comments:  Reviewed his recent results which do show improvement.    9. Colon cancer screening  -     Ambulatory Referral For Screening Colonoscopy    10. Hypertension, essential  Assessment & Plan:  Well controlled today with his blood pressure being 121/76. Lab work was reviewed with the patient. Continue current blood pressure medication.          Follow Up:   No follow-ups on file.     An After Visit Summary and PPPS were made available to the patient.               Transcribed from ambient dictation for Jose A Bell MD by Lakeisha Knutson. .  04/22/22   18:59 EDT    Patient verbalized consent to the visit recording.

## 2022-04-22 NOTE — ASSESSMENT & PLAN NOTE
Told him I be willing to work with him for smoking cessation.  Also recommended low-dose CT scan and explained rationale.  They will consider.  Recommend they go home do some further research online

## 2022-04-22 NOTE — ASSESSMENT & PLAN NOTE
Well controlled today with his blood pressure being 121/76. Lab work was reviewed with the patient. Continue current blood pressure medication.

## 2022-05-25 ENCOUNTER — TELEPHONE (OUTPATIENT)
Dept: FAMILY MEDICINE CLINIC | Age: 77
End: 2022-05-25

## 2022-05-28 DIAGNOSIS — M25.569 KNEE PAIN, UNSPECIFIED CHRONICITY, UNSPECIFIED LATERALITY: Primary | ICD-10-CM

## 2022-06-13 ENCOUNTER — HOSPITAL ENCOUNTER (OUTPATIENT)
Dept: HOSPITAL 49 - FAS | Age: 77
Discharge: HOME | End: 2022-06-13
Attending: SURGERY
Payer: MEDICARE

## 2022-06-13 VITALS — HEIGHT: 75 IN | WEIGHT: 209.99 LBS | BODY MASS INDEX: 26.11 KG/M2

## 2022-06-13 DIAGNOSIS — G47.30: ICD-10-CM

## 2022-06-13 DIAGNOSIS — Z12.11: Primary | ICD-10-CM

## 2022-06-13 DIAGNOSIS — E78.5: ICD-10-CM

## 2022-06-13 DIAGNOSIS — I10 HYPERTENSION, ESSENTIAL: ICD-10-CM

## 2022-06-13 DIAGNOSIS — E11.51: ICD-10-CM

## 2022-06-13 DIAGNOSIS — Z79.84: ICD-10-CM

## 2022-06-13 DIAGNOSIS — I10: ICD-10-CM

## 2022-06-13 DIAGNOSIS — Z79.899: ICD-10-CM

## 2022-06-13 LAB
ALBUMIN SERPL-MCNC: 4.1 G/DL (ref 3.4–5)
ALKALINE PHOSHATASE: 53 U/L (ref 46–116)
ALT SERPL-CCNC: 21 U/L (ref 16–63)
AST: 15 U/L (ref 15–37)
BILIRUBIN - TOTAL: 0.6 MG/DL (ref 0.2–1)
BUN SERPL-MCNC: 20 MG/DL (ref 7–18)
BUN/CREAT RATIO (CALC): 21.1 RATIO
CHLORIDE: 106 MMOL/L (ref 98–107)
CO2 (BICARBONATE): 24 MMOL/L (ref 21–32)
CREATININE: 0.95 MG/DL (ref 0.67–1.17)
GLOBULIN (CALCULATION): 3.4 G/DL
GLUCOSE SERPL-MCNC: 121 MG/DL (ref 74–106)
HCT: 41.8 % (ref 42–52)
HGB BLD-MCNC: 14.2 G/DL (ref 13.2–18)
MCH RBC QN AUTO: 29.9 PG (ref 25–31)
MCHC RBC AUTO-ENTMCNC: 34 G/DL (ref 32–36)
MCV: 88 FL (ref 78–100)
MPV: 10.1 FL (ref 6–9.5)
PLT: 168 K/UL (ref 150–400)
POTASSIUM: 4 MMOL/L (ref 3.5–5.1)
RBC: 4.75 M/UL (ref 4.7–6)
RDW: 13.3 % (ref 11.5–14)
TOTAL PROTEIN: 7.5 G/DL (ref 6.4–8.2)
WBC: 3.7 K/UL (ref 4–10.5)

## 2022-06-13 RX ORDER — FENOFIBRATE 145 MG/1
145 TABLET, COATED ORAL DAILY
Qty: 90 TABLET | Refills: 1 | Status: SHIPPED | OUTPATIENT
Start: 2022-06-13 | End: 2023-01-16

## 2022-06-13 RX ORDER — AMLODIPINE BESYLATE 5 MG/1
5 TABLET ORAL DAILY
Qty: 90 TABLET | Refills: 1 | Status: SHIPPED | OUTPATIENT
Start: 2022-06-13 | End: 2022-10-28 | Stop reason: SDDI

## 2022-08-15 DIAGNOSIS — M25.561 RIGHT KNEE PAIN, UNSPECIFIED CHRONICITY: Primary | ICD-10-CM

## 2022-08-17 ENCOUNTER — HOSPITAL ENCOUNTER (OUTPATIENT)
Dept: GENERAL RADIOLOGY | Facility: HOSPITAL | Age: 77
Discharge: HOME | End: 2022-08-17
Admitting: ORTHOPAEDIC SURGERY

## 2022-08-17 DIAGNOSIS — M25.561 RIGHT KNEE PAIN, UNSPECIFIED CHRONICITY: ICD-10-CM

## 2022-08-17 PROCEDURE — 73560 X-RAY EXAM OF KNEE 1 OR 2: CPT

## 2022-08-18 ENCOUNTER — OFFICE VISIT (OUTPATIENT)
Dept: ORTHOPEDIC SURGERY | Facility: CLINIC | Age: 77
End: 2022-08-18

## 2022-08-18 VITALS — BODY MASS INDEX: 26.11 KG/M2 | TEMPERATURE: 97.6 F | HEIGHT: 75 IN | WEIGHT: 210 LBS

## 2022-08-18 DIAGNOSIS — M17.11 PRIMARY OSTEOARTHRITIS OF RIGHT KNEE: Primary | ICD-10-CM

## 2022-08-18 PROCEDURE — 99204 OFFICE O/P NEW MOD 45 MIN: CPT | Performed by: ORTHOPAEDIC SURGERY

## 2022-08-19 ENCOUNTER — PATIENT ROUNDING (BHMG ONLY) (OUTPATIENT)
Dept: ORTHOPEDIC SURGERY | Facility: CLINIC | Age: 77
End: 2022-08-19

## 2022-08-19 NOTE — PROGRESS NOTES
August 19, 2022    A [employer] Message has been sent to the patient for PATIENT [place] [facility]

## 2022-10-28 ENCOUNTER — OFFICE VISIT (OUTPATIENT)
Dept: FAMILY MEDICINE CLINIC | Age: 77
End: 2022-10-28

## 2022-10-28 ENCOUNTER — LAB (OUTPATIENT)
Dept: LAB | Facility: HOSPITAL | Age: 77
End: 2022-10-28

## 2022-10-28 VITALS
WEIGHT: 201 LBS | TEMPERATURE: 97.2 F | DIASTOLIC BLOOD PRESSURE: 76 MMHG | HEART RATE: 59 BPM | OXYGEN SATURATION: 97 % | BODY MASS INDEX: 24.99 KG/M2 | SYSTOLIC BLOOD PRESSURE: 132 MMHG | HEIGHT: 75 IN

## 2022-10-28 DIAGNOSIS — I10 HYPERTENSION, ESSENTIAL: ICD-10-CM

## 2022-10-28 DIAGNOSIS — E78.2 MIXED HYPERLIPIDEMIA: ICD-10-CM

## 2022-10-28 DIAGNOSIS — E11.9 TYPE 2 DIABETES MELLITUS WITHOUT COMPLICATION, WITHOUT LONG-TERM CURRENT USE OF INSULIN: Primary | ICD-10-CM

## 2022-10-28 DIAGNOSIS — D64.9 ANEMIA, UNSPECIFIED TYPE: ICD-10-CM

## 2022-10-28 LAB
BASOPHILS # BLD AUTO: 0.01 10*3/MM3 (ref 0–0.2)
BASOPHILS NFR BLD AUTO: 0.2 % (ref 0–1.5)
DEPRECATED RDW RBC AUTO: 40.9 FL (ref 37–54)
EOSINOPHIL # BLD AUTO: 0.11 10*3/MM3 (ref 0–0.4)
EOSINOPHIL NFR BLD AUTO: 2.6 % (ref 0.3–6.2)
ERYTHROCYTE [DISTWIDTH] IN BLOOD BY AUTOMATED COUNT: 13.2 % (ref 12.3–15.4)
HCT VFR BLD AUTO: 38.3 % (ref 37.5–51)
HGB BLD-MCNC: 12.9 G/DL (ref 13–17.7)
IMM GRANULOCYTES # BLD AUTO: 0.01 10*3/MM3 (ref 0–0.05)
IMM GRANULOCYTES NFR BLD AUTO: 0.2 % (ref 0–0.5)
LYMPHOCYTES # BLD AUTO: 1.44 10*3/MM3 (ref 0.7–3.1)
LYMPHOCYTES NFR BLD AUTO: 33.4 % (ref 19.6–45.3)
MCH RBC QN AUTO: 28.6 PG (ref 26.6–33)
MCHC RBC AUTO-ENTMCNC: 33.7 G/DL (ref 31.5–35.7)
MCV RBC AUTO: 84.9 FL (ref 79–97)
MONOCYTES # BLD AUTO: 0.43 10*3/MM3 (ref 0.1–0.9)
MONOCYTES NFR BLD AUTO: 10 % (ref 5–12)
NEUTROPHILS NFR BLD AUTO: 2.31 10*3/MM3 (ref 1.7–7)
NEUTROPHILS NFR BLD AUTO: 53.6 % (ref 42.7–76)
PLATELET # BLD AUTO: 148 10*3/MM3 (ref 140–450)
PMV BLD AUTO: 9.3 FL (ref 6–12)
RBC # BLD AUTO: 4.51 10*6/MM3 (ref 4.14–5.8)
WBC NRBC COR # BLD: 4.31 10*3/MM3 (ref 3.4–10.8)

## 2022-10-28 PROCEDURE — 80061 LIPID PANEL: CPT | Performed by: FAMILY MEDICINE

## 2022-10-28 PROCEDURE — 82570 ASSAY OF URINE CREATININE: CPT | Performed by: FAMILY MEDICINE

## 2022-10-28 PROCEDURE — 80053 COMPREHEN METABOLIC PANEL: CPT | Performed by: FAMILY MEDICINE

## 2022-10-28 PROCEDURE — 85025 COMPLETE CBC W/AUTO DIFF WBC: CPT | Performed by: FAMILY MEDICINE

## 2022-10-28 PROCEDURE — 36415 COLL VENOUS BLD VENIPUNCTURE: CPT | Performed by: FAMILY MEDICINE

## 2022-10-28 PROCEDURE — 99214 OFFICE O/P EST MOD 30 MIN: CPT | Performed by: FAMILY MEDICINE

## 2022-10-28 PROCEDURE — 82043 UR ALBUMIN QUANTITATIVE: CPT | Performed by: FAMILY MEDICINE

## 2022-10-28 PROCEDURE — 84466 ASSAY OF TRANSFERRIN: CPT | Performed by: FAMILY MEDICINE

## 2022-10-28 PROCEDURE — 83540 ASSAY OF IRON: CPT | Performed by: FAMILY MEDICINE

## 2022-10-28 PROCEDURE — 83036 HEMOGLOBIN GLYCOSYLATED A1C: CPT | Performed by: FAMILY MEDICINE

## 2022-10-28 NOTE — ASSESSMENT & PLAN NOTE
For now his blood pressure seems okay.  I did ask him to continue to check his blood pressure on occasional basis as it is certainly possible (and quite likely) that blood pressure will go back up again with him off of the medication.

## 2022-10-28 NOTE — PROGRESS NOTES
"Chief Complaint  Hyperlipidemia and Diabetes (Pt here for f/u)    Subjective          Judson Ochoa presents to [facility]  History of Present Illness  Here for follow-up on his chronic health issues    He says that he has not been taking the amlodipine for about the past 3 weeks.  Says that he saw something about a recall of some blood pressure medications, he did not think amlodipine was on the list but  he just decided to hold his medication anyway.  He has a blood pressure monitor at home but says he does not use it very often.    Eye appt coming up in Nov.    Back pain is about the same chronic problem.  Will take Aleve from time to time with benefit.    He has known peripheral artery disease.  Has not followed up with Vasc Surg and he would rather wait until Spring because it is just now getting into a rabbit hunting season    He continues to decline flu shot and pneumonia shot and COVID-vaccine.  He did however get the colonoscopy and it returned normal.    Current Outpatient Medications   Medication Sig Dispense Refill   • fenofibrate (TRICOR) 145 MG tablet TAKE 1 TABLET BY MOUTH DAILY 90 tablet 1   • metFORMIN (GLUCOPHAGE) 500 MG tablet TAKE 1 TABLET BY MOUTH THREE TIMES DAILY 270 tablet 1   • sildenafil (REVATIO) 20 MG tablet Take 20 mg by mouth 3 (Three) Times a Day.       No current facility-administered medications for this visit.       Review of Systems         Objective   Vital Signs:   /76 (BP Location: Left arm, Patient Position: Sitting, Cuff Size: Adult)   Pulse 59   Temp 97.2 °F (36.2 °C) (Oral)   Ht 190.5 cm (75\")   Wt 91.2 kg (201 lb)   SpO2 97%   BMI 25.12 kg/m²     Physical Exam  Constitutional:       Appearance: Normal appearance.   Neck:      Vascular: No carotid bruit.   Cardiovascular:      Rate and Rhythm: Normal rate and regular rhythm.      Heart sounds: Normal heart sounds. No murmur heard.  Pulmonary:      Effort: No respiratory " distress.      Breath sounds: No wheezing or rales.   Musculoskeletal:      Right lower leg: No edema.      Left lower leg: No edema.   Lymphadenopathy:      Cervical: No cervical adenopathy.   Neurological:      General: No focal deficit present.      Mental Status: He is alert.   Psychiatric:         Attention and Perception: Attention normal.         Mood and Affect: Mood normal.         Speech: Speech normal.            Result Review :                     Assessment and Plan    Diagnoses and all orders for this visit:    1. Type 2 diabetes mellitus without complication, without long-term current use of insulin (HCC) (Primary)  Comments:  We will check his labs and see if we need to make alterations in medication.  Eye exam scheduled for November.  Orders:  -     Hemoglobin A1c  -     Comprehensive Metabolic Panel  -     Microalbumin / Creatinine Urine Ratio - Urine, Clean Catch    2. Mixed hyperlipidemia  Comments:  Check lab predicate medication change based on result  Orders:  -     Lipid Panel  -     Comprehensive Metabolic Panel    3. Hypertension, essential  Assessment & Plan:  For now his blood pressure seems okay.  I did ask him to continue to check his blood pressure on occasional basis as it is certainly possible (and quite likely) that blood pressure will go back up again with him off of the medication.    Orders:  -     CBC Auto Differential      Follow Up   No follow-ups on file.  Patient was given instructions and counseling regarding his condition or for health maintenance advice. Please see specific information pulled into the AVS if appropriate.

## 2022-10-29 DIAGNOSIS — D64.9 ANEMIA, UNSPECIFIED TYPE: Primary | ICD-10-CM

## 2022-10-29 LAB
ALBUMIN SERPL-MCNC: 4.4 G/DL (ref 3.5–5.2)
ALBUMIN UR-MCNC: <1.2 MG/DL
ALBUMIN/GLOB SERPL: 2.1 G/DL
ALP SERPL-CCNC: 52 U/L (ref 39–117)
ALT SERPL W P-5'-P-CCNC: 13 U/L (ref 1–41)
ANION GAP SERPL CALCULATED.3IONS-SCNC: 8.7 MMOL/L (ref 5–15)
AST SERPL-CCNC: 20 U/L (ref 1–40)
BILIRUB SERPL-MCNC: 0.3 MG/DL (ref 0–1.2)
BUN SERPL-MCNC: 13 MG/DL (ref 8–23)
BUN/CREAT SERPL: 13.1 (ref 7–25)
CALCIUM SPEC-SCNC: 9.1 MG/DL (ref 8.6–10.5)
CHLORIDE SERPL-SCNC: 106 MMOL/L (ref 98–107)
CHOLEST SERPL-MCNC: 173 MG/DL (ref 0–200)
CO2 SERPL-SCNC: 27.3 MMOL/L (ref 22–29)
CREAT SERPL-MCNC: 0.99 MG/DL (ref 0.76–1.27)
CREAT UR-MCNC: 67.2 MG/DL
EGFRCR SERPLBLD CKD-EPI 2021: 79.4 ML/MIN/1.73
GLOBULIN UR ELPH-MCNC: 2.1 GM/DL
GLUCOSE SERPL-MCNC: 147 MG/DL (ref 65–99)
HBA1C MFR BLD: 6.8 % (ref 4.8–5.6)
HDLC SERPL-MCNC: 46 MG/DL (ref 40–60)
IRON 24H UR-MRATE: 83 MCG/DL (ref 59–158)
IRON SATN MFR SERPL: 19 % (ref 20–50)
LDLC SERPL CALC-MCNC: 95 MG/DL (ref 0–100)
LDLC/HDLC SERPL: 1.95 {RATIO}
MICROALBUMIN/CREAT UR: NORMAL MG/G{CREAT}
POTASSIUM SERPL-SCNC: 4.3 MMOL/L (ref 3.5–5.2)
PROT SERPL-MCNC: 6.5 G/DL (ref 6–8.5)
SODIUM SERPL-SCNC: 142 MMOL/L (ref 136–145)
TIBC SERPL-MCNC: 438 MCG/DL (ref 298–536)
TRANSFERRIN SERPL-MCNC: 294 MG/DL (ref 200–360)
TRIGL SERPL-MCNC: 186 MG/DL (ref 0–150)
VLDLC SERPL-MCNC: 32 MG/DL (ref 5–40)

## 2023-01-03 DIAGNOSIS — I71.40 ABDOMINAL AORTIC ANEURYSM (AAA) WITHOUT RUPTURE, UNSPECIFIED PART: Primary | ICD-10-CM

## 2023-01-15 DIAGNOSIS — I10 HYPERTENSION, ESSENTIAL: ICD-10-CM

## 2023-01-16 RX ORDER — AMLODIPINE BESYLATE 5 MG/1
5 TABLET ORAL DAILY
Qty: 90 TABLET | Refills: 1 | OUTPATIENT
Start: 2023-01-16

## 2023-01-16 RX ORDER — FENOFIBRATE 145 MG/1
145 TABLET, COATED ORAL DAILY
Qty: 90 TABLET | Refills: 0 | Status: SHIPPED | OUTPATIENT
Start: 2023-01-16

## 2023-04-24 ENCOUNTER — OFFICE VISIT (OUTPATIENT)
Dept: FAMILY MEDICINE CLINIC | Age: 78
End: 2023-04-24
Payer: MEDICARE

## 2023-04-24 ENCOUNTER — LAB (OUTPATIENT)
Dept: LAB | Facility: HOSPITAL | Age: 78
End: 2023-04-24
Payer: MEDICARE

## 2023-04-24 VITALS
SYSTOLIC BLOOD PRESSURE: 150 MMHG | OXYGEN SATURATION: 97 % | HEIGHT: 75 IN | DIASTOLIC BLOOD PRESSURE: 80 MMHG | HEART RATE: 56 BPM | WEIGHT: 207 LBS | BODY MASS INDEX: 25.74 KG/M2

## 2023-04-24 DIAGNOSIS — I71.40 ABDOMINAL AORTIC ANEURYSM (AAA) WITHOUT RUPTURE, UNSPECIFIED PART: ICD-10-CM

## 2023-04-24 DIAGNOSIS — E11.9 TYPE 2 DIABETES MELLITUS WITHOUT COMPLICATION, WITHOUT LONG-TERM CURRENT USE OF INSULIN: ICD-10-CM

## 2023-04-24 DIAGNOSIS — N52.01 ERECTILE DYSFUNCTION DUE TO ARTERIAL INSUFFICIENCY: ICD-10-CM

## 2023-04-24 DIAGNOSIS — Z00.00 MEDICARE ANNUAL WELLNESS VISIT, SUBSEQUENT: Primary | ICD-10-CM

## 2023-04-24 DIAGNOSIS — D50.9 IRON DEFICIENCY ANEMIA, UNSPECIFIED IRON DEFICIENCY ANEMIA TYPE: ICD-10-CM

## 2023-04-24 DIAGNOSIS — I73.9 PERIPHERAL VASCULAR DISEASE, UNSPECIFIED: ICD-10-CM

## 2023-04-24 DIAGNOSIS — I10 HYPERTENSION, ESSENTIAL: ICD-10-CM

## 2023-04-24 DIAGNOSIS — Z12.5 SCREENING FOR PROSTATE CANCER: ICD-10-CM

## 2023-04-24 DIAGNOSIS — E78.2 MIXED HYPERLIPIDEMIA: ICD-10-CM

## 2023-04-24 PROBLEM — N52.9 VASCULOGENIC ERECTILE DYSFUNCTION: Status: ACTIVE | Noted: 2023-04-24

## 2023-04-24 LAB
ALBUMIN SERPL-MCNC: 4.5 G/DL (ref 3.5–5.2)
ALBUMIN/GLOB SERPL: 1.8 G/DL
ALP SERPL-CCNC: 46 U/L (ref 39–117)
ALT SERPL W P-5'-P-CCNC: 17 U/L (ref 1–41)
ANION GAP SERPL CALCULATED.3IONS-SCNC: 11 MMOL/L (ref 5–15)
AST SERPL-CCNC: 16 U/L (ref 1–40)
BASOPHILS # BLD AUTO: 0.02 10*3/MM3 (ref 0–0.2)
BASOPHILS NFR BLD AUTO: 0.5 % (ref 0–1.5)
BILIRUB SERPL-MCNC: 0.4 MG/DL (ref 0–1.2)
BUN SERPL-MCNC: 17 MG/DL (ref 8–23)
BUN/CREAT SERPL: 15 (ref 7–25)
CALCIUM SPEC-SCNC: 9.3 MG/DL (ref 8.6–10.5)
CHLORIDE SERPL-SCNC: 104 MMOL/L (ref 98–107)
CHOLEST SERPL-MCNC: 169 MG/DL (ref 0–200)
CO2 SERPL-SCNC: 25 MMOL/L (ref 22–29)
CREAT SERPL-MCNC: 1.13 MG/DL (ref 0.76–1.27)
DEPRECATED RDW RBC AUTO: 43.2 FL (ref 37–54)
EGFRCR SERPLBLD CKD-EPI 2021: 67.8 ML/MIN/1.73
EOSINOPHIL # BLD AUTO: 0.12 10*3/MM3 (ref 0–0.4)
EOSINOPHIL NFR BLD AUTO: 3.2 % (ref 0.3–6.2)
ERYTHROCYTE [DISTWIDTH] IN BLOOD BY AUTOMATED COUNT: 13.3 % (ref 12.3–15.4)
FERRITIN SERPL-MCNC: 174 NG/ML (ref 30–400)
GLOBULIN UR ELPH-MCNC: 2.5 GM/DL
GLUCOSE SERPL-MCNC: 170 MG/DL (ref 65–99)
HBA1C MFR BLD: 7.6 % (ref 4.8–5.6)
HCT VFR BLD AUTO: 40.6 % (ref 37.5–51)
HDLC SERPL-MCNC: 52 MG/DL (ref 40–60)
HGB BLD-MCNC: 13.5 G/DL (ref 13–17.7)
IMM GRANULOCYTES # BLD AUTO: 0.02 10*3/MM3 (ref 0–0.05)
IMM GRANULOCYTES NFR BLD AUTO: 0.5 % (ref 0–0.5)
IRON 24H UR-MRATE: 153 MCG/DL (ref 59–158)
IRON SATN MFR SERPL: 29 % (ref 20–50)
LDLC SERPL CALC-MCNC: 93 MG/DL (ref 0–100)
LDLC/HDLC SERPL: 1.72 {RATIO}
LYMPHOCYTES # BLD AUTO: 1.84 10*3/MM3 (ref 0.7–3.1)
LYMPHOCYTES NFR BLD AUTO: 49.1 % (ref 19.6–45.3)
MCH RBC QN AUTO: 29.2 PG (ref 26.6–33)
MCHC RBC AUTO-ENTMCNC: 33.3 G/DL (ref 31.5–35.7)
MCV RBC AUTO: 87.9 FL (ref 79–97)
MONOCYTES # BLD AUTO: 0.5 10*3/MM3 (ref 0.1–0.9)
MONOCYTES NFR BLD AUTO: 13.3 % (ref 5–12)
NEUTROPHILS NFR BLD AUTO: 1.25 10*3/MM3 (ref 1.7–7)
NEUTROPHILS NFR BLD AUTO: 33.4 % (ref 42.7–76)
PLATELET # BLD AUTO: 165 10*3/MM3 (ref 140–450)
PMV BLD AUTO: 9.9 FL (ref 6–12)
POTASSIUM SERPL-SCNC: 4.5 MMOL/L (ref 3.5–5.2)
PROT SERPL-MCNC: 7 G/DL (ref 6–8.5)
PSA SERPL-MCNC: 5.19 NG/ML (ref 0–4)
RBC # BLD AUTO: 4.62 10*6/MM3 (ref 4.14–5.8)
RETICS # AUTO: 0.12 10*6/MM3 (ref 0.02–0.13)
RETICS/RBC NFR AUTO: 2.52 % (ref 0.7–1.9)
SODIUM SERPL-SCNC: 140 MMOL/L (ref 136–145)
TIBC SERPL-MCNC: 532 MCG/DL (ref 298–536)
TRANSFERRIN SERPL-MCNC: 357 MG/DL (ref 200–360)
TRIGL SERPL-MCNC: 137 MG/DL (ref 0–150)
VLDLC SERPL-MCNC: 24 MG/DL (ref 5–40)
WBC NRBC COR # BLD: 3.75 10*3/MM3 (ref 3.4–10.8)

## 2023-04-24 PROCEDURE — G0103 PSA SCREENING: HCPCS | Performed by: FAMILY MEDICINE

## 2023-04-24 PROCEDURE — 84466 ASSAY OF TRANSFERRIN: CPT | Performed by: FAMILY MEDICINE

## 2023-04-24 PROCEDURE — 85045 AUTOMATED RETICULOCYTE COUNT: CPT | Performed by: FAMILY MEDICINE

## 2023-04-24 PROCEDURE — 80053 COMPREHEN METABOLIC PANEL: CPT | Performed by: FAMILY MEDICINE

## 2023-04-24 PROCEDURE — 83036 HEMOGLOBIN GLYCOSYLATED A1C: CPT | Performed by: FAMILY MEDICINE

## 2023-04-24 PROCEDURE — 83540 ASSAY OF IRON: CPT | Performed by: FAMILY MEDICINE

## 2023-04-24 PROCEDURE — 80061 LIPID PANEL: CPT | Performed by: FAMILY MEDICINE

## 2023-04-24 PROCEDURE — 82728 ASSAY OF FERRITIN: CPT | Performed by: FAMILY MEDICINE

## 2023-04-24 PROCEDURE — 85025 COMPLETE CBC W/AUTO DIFF WBC: CPT | Performed by: FAMILY MEDICINE

## 2023-04-24 PROCEDURE — 36415 COLL VENOUS BLD VENIPUNCTURE: CPT | Performed by: FAMILY MEDICINE

## 2023-04-24 RX ORDER — ASPIRIN 81 MG/1
81 TABLET ORAL DAILY
Qty: 30 TABLET | Refills: 11 | COMMUNITY
Start: 2023-02-13 | End: 2024-02-13

## 2023-04-24 RX ORDER — AMLODIPINE BESYLATE 5 MG/1
5 TABLET ORAL DAILY
COMMUNITY
Start: 2022-10-11

## 2023-04-24 RX ORDER — SILDENAFIL CITRATE 20 MG/1
20 TABLET ORAL AS NEEDED
Qty: 20 TABLET | Refills: 0 | Status: SHIPPED | OUTPATIENT
Start: 2023-04-24

## 2023-04-24 RX ORDER — FENOFIBRATE 145 MG/1
145 TABLET, COATED ORAL DAILY
Qty: 90 TABLET | Refills: 0 | Status: SHIPPED | OUTPATIENT
Start: 2023-04-24

## 2023-04-24 NOTE — PROGRESS NOTES
The ABCs of the Annual Wellness Visit  Subsequent Medicare Wellness Visit    Subjective    Judson Ochoa is a 77 y.o. male who presents for a Subsequent Medicare Wellness Visit.    The following portions of the patient's history were reviewed and   updated as appropriate: allergies, current medications, past family history, past medical history, past social history, past surgical history and problem list.    Compared to one year ago, the patient feels his physical   health is the same.    Compared to one year ago, the patient feels his mental   health is the same.    Recent Hospitalizations:  He was not admitted to the hospital during the last year.       Current Medical Providers:  Patient Care Team:  Jose A Bell MD as PCP - General (Family Medicine)  Angel Ruavlcaba MD (Vascular Surgery)    Outpatient Medications Prior to Visit   Medication Sig Dispense Refill   • amLODIPine (NORVASC) 5 MG tablet Take 1 tablet by mouth Daily.     • aspirin 81 MG EC tablet Take 1 tablet by mouth Daily. 30 tablet 11   • metFORMIN (GLUCOPHAGE) 500 MG tablet TAKE 1 TABLET BY MOUTH THREE TIMES DAILY 270 tablet 1   • fenofibrate (TRICOR) 145 MG tablet TAKE 1 TABLET BY MOUTH DAILY 90 tablet 0   • sildenafil (REVATIO) 20 MG tablet Take 1 tablet by mouth As Needed.       No facility-administered medications prior to visit.       No opioid medication identified on active medication list. I have reviewed chart for other potential  high risk medication/s and harmful drug interactions in the elderly.          Aspirin is on active medication list. Aspirin use is indicated based on review of current medical condition/s. Pros and cons of this therapy have been discussed today. Benefits of this medication outweigh potential harm.  Patient has been encouraged to continue taking this medication.  Due to the stents in place.    Patient Active Problem List   Diagnosis   • Cigarette smoker   • Abdominal aortic aneurysm without rupture  "  • Peripheral vascular disease, unspecified   • Type 2 diabetes mellitus without complication, without long-term current use of insulin ([facility])   • Mixed hyperlipidemia   • Paresthesia of upper extremity   • EASTON on CPAP   • PSA elevation   • Chronic midline low back pain   • Obstructive sleep apnea (adult) (pediatric)   • Hypertension, essential   • Medicare annual wellness visit, subsequent   • History of fall   • Vasculogenic erectile dysfunction     Advance Care Planning   Advance Care Planning     Advance Directive is not on file.  ACP discussion was held with the patient during this visit. Patient has an advance directive (not in EMR), copy requested.     Objective    Vitals:    23 0925   BP: 150/80   BP Location: Left arm   Patient Position: Sitting   Cuff Size: Large Adult   Pulse: 56   SpO2: 97%   Weight: 93.9 kg (207 lb)   Height: 190.5 cm (75\")     Estimated body mass index is 25.87 kg/m² as calculated from the following:    Height as of this encounter: 190.5 cm (75\").    Weight as of this encounter: 93.9 kg (207 lb).    BMI is >= 25 and <30. (Overweight) The following options were offered after discussion;: doing okay      Does the patient have evidence of cognitive impairment? No    Lab Results   Component Value Date    TRIG 137 2023    HDL 52 2023    LDL 93 2023    VLDL 24 2023    HGBA1C 7.60 (H) 2023        HEALTH RISK ASSESSMENT    Smoking Status:  Social History     Tobacco Use   Smoking Status Every Day   • Packs/day: 1.00   • Years: 60.00   • Pack years: 60.00   • Types: Cigarettes   Smokeless Tobacco Never     Alcohol Consumption:  Social History     Substance and Sexual Activity   Alcohol Use Yes    Comment: occasional     Fall Risk Screen:    STEADI Fall Risk Assessment was completed, and patient is at LOW risk for falls.Assessment completed on:2023    Depression Screenin/24/2023     9:30 AM   PHQ-2/PHQ-9 Depression Screening   Little Interest or " Pleasure in Doing Things 0-->not at all   Feeling Down, Depressed or Hopeless 0-->not at all   PHQ-9: Brief Depression Severity Measure Score 0       Health Habits and Functional and Cognitive Screenin/24/2023     9:31 AM   Functional & Cognitive Status   Do you have difficulty preparing food and eating? No   Do you have difficulty bathing yourself, getting dressed or grooming yourself? No   Do you have difficulty using the toilet? No   Do you have difficulty moving around from place to place? No   Do you have trouble with steps or getting out of a bed or a chair? No   Current Diet Well Balanced Diet   Dental Exam Up to date   Eye Exam Up to date   Exercise (times per week) 0 times per week   Current Exercises Include No Regular Exercise   Do you need help using the phone?  No   Are you deaf or do you have serious difficulty hearing?  Yes   Do you need help with transportation? No   Do you need help shopping? No   Do you need help preparing meals?  No   Do you need help with housework?  No   Do you need help with laundry? No   Do you need help taking your medications? No   Do you need help managing money? No   Do you ever drive or ride in a car without wearing a seat belt? No   Have you felt unusual stress, anger or loneliness in the last month? No   Who do you live with? Spouse   Have you been bothered in the last four weeks by sexual problems? Yes   Do you have difficulty concentrating, remembering or making decisions? No       Age-appropriate Screening Schedule:  Refer to the list below for future screening recommendations based on patient's age, sex and/or medical conditions. Orders for these recommended tests are listed in the plan section. The patient has been provided with a written plan.    Health Maintenance   Topic Date Due   • COVID-19 Vaccine (1) Never done   • ZOSTER VACCINE (1 of 2) Never done   • TDAP/TD VACCINES (3 - Td or Tdap) 2022   • DIABETIC FOOT EXAM  2023   • Pneumococcal  "Vaccine 65+ (1 - PCV) 10/28/2023 (Originally 5/26/1951)   • LUNG CANCER SCREENING  10/28/2023 (Originally 5/26/1995)   • INFLUENZA VACCINE  08/01/2023   • HEMOGLOBIN A1C  10/24/2023   • URINE MICROALBUMIN  10/28/2023   • DIABETIC EYE EXAM  11/16/2023   • ANNUAL WELLNESS VISIT  04/24/2024   • LIPID PANEL  04/24/2024   • COLORECTAL CANCER SCREENING  06/13/2032   • HEPATITIS C SCREENING  Completed                  CMS Preventative Services Quick Reference  Risk Factors Identified During Encounter  Hearing Problem: He isn't interested in further evaluation yet  The above risks/problems have been discussed with the patient.  Pertinent information has been shared with the patient in the After Visit Summary.  An After Visit Summary and PPPS were made available to the patient.    Follow Up:   Next Medicare Wellness visit to be scheduled in 1 year.       Additional E&M Note during same encounter follows:  Patient has multiple medical problems which are significant and separately identifiable that require additional work above and beyond the Medicare Wellness Visit.      Chief Complaint  Medicare Wellness-subsequent, Diabetes, Hypertension, Hyperlipidemia, and Anemia    Subjective        HPI  Judson Ochoa is also being seen today for other health issues as noted below    For the most part patient says he feels pretty good.  He still able to get out and do some hunting but admits he has to pause and rest and allow his knee to \"catch up\".    Does have diabetes and hypertension.  Tolerates the medicines okay.  No problems with low blood sugars.       Previous lab work did show that he had iron deficiency anemia.  Had had a colonoscopy last year which was unremarkable.  I have suggested to him for consideration of upper GI work-up if his iron level and CBC continues to show a concern today.    He did make it into see the vascular surgery people at  and reviewed that note today.  Remains essentially stable.  Follow-up " "again in 1 year.    Does report that he is having some issues with erectile dysfunction would like prescription for sildenafil        Objective   Vital Signs:  /80 (BP Location: Left arm, Patient Position: Sitting, Cuff Size: Large Adult)   Pulse 56   Ht 190.5 cm (75\")   Wt 93.9 kg (207 lb)   SpO2 97%   BMI 25.87 kg/m²     Physical Exam   No acute distress  Color is good  He is hard of hearing  Ear canals are free of wax  Tympanic membranes are clear  Oropharynx is clear  No cervical or supraclavicular adenopathy  Thyroid nonenlarged and nontender  Heart is regular rate and rhythm with 1/6 to 2/6 systolic murmur  Lungs bilaterally clear  Abdomen positive bowel sounds, soft, no organomegaly, no mass  Lower extremities are without edema  Dorsalis pedis and posterior tibial pulses are good bilaterally  Left foot with diminished sensation to monofilament testing 2/5 with right foot sensation good 5/5  No significant foot lesions or ulcerations or deformity, does have some callus formation.                     Assessment and Plan   Diagnoses and all orders for this visit:    1. Medicare annual wellness visit, subsequent (Primary)  Assessment & Plan:  We reviewed the preventive service recommendations and created an individualized handout.  Informed him Tdap and shingles vaccine are covered by Medicare at the pharmacy.      2. Type 2 diabetes mellitus without complication, without long-term current use of insulin ([facility])  Assessment & Plan:  General Curt he has done pretty well with his blood sugar control.  We will check labs today and predicate medication changes based on his results    Orders:  -     Hemoglobin A1c  -     Comprehensive Metabolic Panel    3. Mixed hyperlipidemia  Assessment & Plan:  Likewise, we will check labs and predicate medication change based on results    Orders:  -     Lipid Panel  -     Comprehensive Metabolic Panel  -     fenofibrate (TRICOR) 145 MG tablet; Take 1 tablet by mouth " Daily.  Dispense: 90 tablet; Refill: 0    4. Hypertension, essential  Assessment & Plan:  Blood pressure running a little bit elevated today.  Getting back on the amlodipine 5 mg daily    Orders:  -     Comprehensive Metabolic Panel    5. Abdominal aortic aneurysm (AAA) without rupture, unspecified part  Assessment & Plan:  Stable at present.  Continue to follow with vascular surgery as recommended      6. Peripheral vascular disease, unspecified  Assessment & Plan:  Risk factor reduction strategies.  Follow-up with vascular surgery as recommended      7. Screening for prostate cancer  -     PSA Screen    8. Iron deficiency anemia, unspecified iron deficiency anemia type  -     Iron Profile  -     Ferritin  -     CBC & Differential  -     Reticulocytes    9. Erectile dysfunction due to arterial insufficiency  Assessment & Plan:  Give him sildenafil.  Reviewed potential side effects and appropriate use of the medication    Orders:  -     sildenafil (REVATIO) 20 MG tablet; Take 1 tablet by mouth As Needed (one hour before sex).  Dispense: 20 tablet; Refill: 0           Follow Up   No follow-ups on file.  Patient was given instructions and counseling regarding his condition or for health maintenance advice. Please see specific information pulled into the AVS if appropriate.

## 2023-04-27 NOTE — ASSESSMENT & PLAN NOTE
We reviewed the preventive service recommendations and created an individualized handout.  Informed him Tdap and shingles vaccine are covered by Medicare at the pharmacy.

## 2023-04-27 NOTE — ASSESSMENT & PLAN NOTE
General Curt he has done pretty well with his blood sugar control.  We will check labs today and predicate medication changes based on his results

## 2023-07-25 DIAGNOSIS — E78.2 MIXED HYPERLIPIDEMIA: ICD-10-CM

## 2023-07-25 RX ORDER — FENOFIBRATE 145 MG/1
145 TABLET, COATED ORAL DAILY
Qty: 90 TABLET | Refills: 0 | Status: SHIPPED | OUTPATIENT
Start: 2023-07-25

## 2023-10-24 ENCOUNTER — LAB (OUTPATIENT)
Dept: LAB | Facility: HOSPITAL | Age: 78
End: 2023-10-24
Payer: MEDICARE

## 2023-10-24 ENCOUNTER — OFFICE VISIT (OUTPATIENT)
Dept: FAMILY MEDICINE CLINIC | Age: 78
End: 2023-10-24
Payer: MEDICARE

## 2023-10-24 VITALS
HEART RATE: 52 BPM | BODY MASS INDEX: 25.66 KG/M2 | TEMPERATURE: 97.4 F | SYSTOLIC BLOOD PRESSURE: 154 MMHG | DIASTOLIC BLOOD PRESSURE: 63 MMHG | WEIGHT: 206.4 LBS | HEIGHT: 75 IN

## 2023-10-24 DIAGNOSIS — I10 HYPERTENSION, ESSENTIAL: ICD-10-CM

## 2023-10-24 DIAGNOSIS — I71.40 ABDOMINAL AORTIC ANEURYSM (AAA) WITHOUT RUPTURE, UNSPECIFIED PART: ICD-10-CM

## 2023-10-24 DIAGNOSIS — E78.2 MIXED HYPERLIPIDEMIA: ICD-10-CM

## 2023-10-24 DIAGNOSIS — Z23 NEED FOR VACCINATION: ICD-10-CM

## 2023-10-24 DIAGNOSIS — E11.9 TYPE 2 DIABETES MELLITUS WITHOUT COMPLICATION, WITHOUT LONG-TERM CURRENT USE OF INSULIN: Primary | ICD-10-CM

## 2023-10-24 DIAGNOSIS — E11.9 TYPE 2 DIABETES MELLITUS WITHOUT COMPLICATION, WITHOUT LONG-TERM CURRENT USE OF INSULIN: ICD-10-CM

## 2023-10-24 LAB
ALBUMIN SERPL-MCNC: 4.7 G/DL (ref 3.5–5.2)
ALBUMIN/GLOB SERPL: 2 G/DL
ALP SERPL-CCNC: 46 U/L (ref 39–117)
ALT SERPL W P-5'-P-CCNC: 21 U/L (ref 1–41)
ANION GAP SERPL CALCULATED.3IONS-SCNC: 10 MMOL/L (ref 5–15)
AST SERPL-CCNC: 19 U/L (ref 1–40)
BASOPHILS # BLD AUTO: 0.02 10*3/MM3 (ref 0–0.2)
BASOPHILS NFR BLD AUTO: 0.6 % (ref 0–1.5)
BILIRUB SERPL-MCNC: 0.4 MG/DL (ref 0–1.2)
BUN SERPL-MCNC: 19 MG/DL (ref 8–23)
BUN/CREAT SERPL: 18.4 (ref 7–25)
CALCIUM SPEC-SCNC: 9.5 MG/DL (ref 8.6–10.5)
CHLORIDE SERPL-SCNC: 106 MMOL/L (ref 98–107)
CHOLEST SERPL-MCNC: 170 MG/DL (ref 0–200)
CO2 SERPL-SCNC: 26 MMOL/L (ref 22–29)
CREAT SERPL-MCNC: 1.03 MG/DL (ref 0.76–1.27)
DEPRECATED RDW RBC AUTO: 43.7 FL (ref 37–54)
EGFRCR SERPLBLD CKD-EPI 2021: 75.3 ML/MIN/1.73
EOSINOPHIL # BLD AUTO: 0.15 10*3/MM3 (ref 0–0.4)
EOSINOPHIL NFR BLD AUTO: 4.6 % (ref 0.3–6.2)
ERYTHROCYTE [DISTWIDTH] IN BLOOD BY AUTOMATED COUNT: 13.5 % (ref 12.3–15.4)
GLOBULIN UR ELPH-MCNC: 2.3 GM/DL
GLUCOSE SERPL-MCNC: 130 MG/DL (ref 65–99)
HBA1C MFR BLD: 7.3 % (ref 4.8–5.6)
HCT VFR BLD AUTO: 40.7 % (ref 37.5–51)
HDLC SERPL-MCNC: 50 MG/DL (ref 40–60)
HGB BLD-MCNC: 13.4 G/DL (ref 13–17.7)
IMM GRANULOCYTES # BLD AUTO: 0.01 10*3/MM3 (ref 0–0.05)
IMM GRANULOCYTES NFR BLD AUTO: 0.3 % (ref 0–0.5)
LDLC SERPL CALC-MCNC: 90 MG/DL (ref 0–100)
LDLC/HDLC SERPL: 1.69 {RATIO}
LYMPHOCYTES # BLD AUTO: 1.28 10*3/MM3 (ref 0.7–3.1)
LYMPHOCYTES NFR BLD AUTO: 39.6 % (ref 19.6–45.3)
MCH RBC QN AUTO: 28.9 PG (ref 26.6–33)
MCHC RBC AUTO-ENTMCNC: 32.9 G/DL (ref 31.5–35.7)
MCV RBC AUTO: 87.7 FL (ref 79–97)
MONOCYTES # BLD AUTO: 0.5 10*3/MM3 (ref 0.1–0.9)
MONOCYTES NFR BLD AUTO: 15.5 % (ref 5–12)
NEUTROPHILS NFR BLD AUTO: 1.27 10*3/MM3 (ref 1.7–7)
NEUTROPHILS NFR BLD AUTO: 39.4 % (ref 42.7–76)
PLATELET # BLD AUTO: 140 10*3/MM3 (ref 140–450)
PMV BLD AUTO: 9.2 FL (ref 6–12)
POTASSIUM SERPL-SCNC: 4.6 MMOL/L (ref 3.5–5.2)
PROT SERPL-MCNC: 7 G/DL (ref 6–8.5)
RBC # BLD AUTO: 4.64 10*6/MM3 (ref 4.14–5.8)
SODIUM SERPL-SCNC: 142 MMOL/L (ref 136–145)
TRIGL SERPL-MCNC: 177 MG/DL (ref 0–150)
VLDLC SERPL-MCNC: 30 MG/DL (ref 5–40)
WBC NRBC COR # BLD: 3.23 10*3/MM3 (ref 3.4–10.8)

## 2023-10-24 PROCEDURE — 36415 COLL VENOUS BLD VENIPUNCTURE: CPT

## 2023-10-24 PROCEDURE — 83036 HEMOGLOBIN GLYCOSYLATED A1C: CPT

## 2023-10-24 PROCEDURE — 80053 COMPREHEN METABOLIC PANEL: CPT

## 2023-10-24 PROCEDURE — 80061 LIPID PANEL: CPT

## 2023-10-24 PROCEDURE — 85025 COMPLETE CBC W/AUTO DIFF WBC: CPT

## 2023-10-24 RX ORDER — FENOFIBRATE 145 MG/1
145 TABLET, COATED ORAL DAILY
Qty: 90 TABLET | Refills: 1 | Status: SHIPPED | OUTPATIENT
Start: 2023-10-24

## 2023-10-24 RX ORDER — LISINOPRIL 10 MG/1
10 TABLET ORAL DAILY
Qty: 90 TABLET | Refills: 1 | Status: SHIPPED | OUTPATIENT
Start: 2023-10-24

## 2023-10-24 RX ORDER — AMLODIPINE BESYLATE 5 MG/1
5 TABLET ORAL DAILY
Qty: 90 TABLET | Refills: 1 | Status: SHIPPED | OUTPATIENT
Start: 2023-10-24

## 2023-10-24 NOTE — ASSESSMENT & PLAN NOTE
We will check labs predicate medication changes based on results keeping in mind that he has been on vacation in [place] which could have affected his diet

## 2023-10-24 NOTE — PROGRESS NOTES
"Chief Complaint  Hypertension, Hyperlipidemia, and Diabetes    Subjective          Judson Ochoa presents to [facility]  History of Present Illness  Mr. Ochoa was in [place] about a month ago.  Sounds like he had a great trip.    Here today follow-up on his chronic health issues  At his last lab work showed hemoglobin A1c to be significantly higher than usual.  Only checks BS about every two weeks  Pizza is the one food he has found that drives his BS high  Has eye doctor appt coming up in next couple weeks    He is not taking Amlodipine \"like I should\"     He has hyperlipidemia and tolerates the Tricor        Current Outpatient Medications   Medication Sig Dispense Refill    amLODIPine (NORVASC) 5 MG tablet Take 1 tablet by mouth Daily. 90 tablet 1    aspirin 81 MG EC tablet Take 1 tablet by mouth Daily. 30 tablet 11    fenofibrate (TRICOR) 145 MG tablet Take 1 tablet by mouth Daily. 90 tablet 1    metFORMIN (GLUCOPHAGE) 500 MG tablet Take 1 tablet by mouth 3 (Three) Times a Day. 270 tablet 1    sildenafil (REVATIO) 20 MG tablet Take 1 tablet by mouth As Needed (one hour before sex). 20 tablet 0    lisinopril (PRINIVIL,ZESTRIL) 10 MG tablet Take 1 tablet by mouth Daily. 90 tablet 1     No current facility-administered medications for this visit.       Review of Systems         Objective   Vital Signs:   /63 (BP Location: Right arm, Patient Position: Sitting)   Pulse 52   Temp 97.4 °F (36.3 °C) (Oral)   Ht 190.5 cm (75\")   Wt 93.6 kg (206 lb 6.4 oz)   BMI 25.80 kg/m²     Physical Exam  Constitutional:       Appearance: Normal appearance.   Neck:      Vascular: No carotid bruit.   Cardiovascular:      Rate and Rhythm: Normal rate and regular rhythm.      Heart sounds: Normal heart sounds. No murmur heard.  Pulmonary:      Effort: No respiratory distress.      Breath sounds: No wheezing or rales.   Musculoskeletal:      Right lower leg: No edema.      Left lower " leg: No edema.   Lymphadenopathy:      Cervical: No cervical adenopathy.   Neurological:      General: No focal deficit present.      Mental Status: He is alert.   Psychiatric:         Attention and Perception: Attention normal.         Mood and Affect: Mood normal.         Speech: Speech normal.            Result Review :                     Assessment and Plan    Diagnoses and all orders for this visit:    1. Type 2 diabetes mellitus without complication, without long-term current use of insulin (HCC) (Primary)  Assessment & Plan:  We will check labs predicate medication changes based on results keeping in mind that he has been on vacation in [place] which could have affected his diet    Orders:  -     Hemoglobin A1c; Future  -     metFORMIN (GLUCOPHAGE) 500 MG tablet; Take 1 tablet by mouth 3 (Three) Times a Day.  Dispense: 270 tablet; Refill: 1    2. Mixed hyperlipidemia  Assessment & Plan:  Check lab predicate medication change based on results    Orders:  -     Lipid Panel; Future  -     Comprehensive Metabolic Panel; Future  -     fenofibrate (TRICOR) 145 MG tablet; Take 1 tablet by mouth Daily.  Dispense: 90 tablet; Refill: 1    3. Hypertension, essential  Assessment & Plan:  Blood pressure running a little higher than I like to see.  Regarding add lisinopril 10 mg daily to his current regimen which will also add an element of renal protection    Orders:  -     Comprehensive Metabolic Panel; Future  -     CBC Auto Differential; Future  -     amLODIPine (NORVASC) 5 MG tablet; Take 1 tablet by mouth Daily.  Dispense: 90 tablet; Refill: 1  -     lisinopril (PRINIVIL,ZESTRIL) 10 MG tablet; Take 1 tablet by mouth Daily.  Dispense: 90 tablet; Refill: 1    4. Abdominal aortic aneurysm (AAA) without rupture, unspecified part  Assessment & Plan:  He did make it back into vascular surgery [facility], and I reviewed that note.  They have asked him to continue with annual surveillance and follow-up.      5. Need for  vaccination  Assessment & Plan:  Informed of availability of shingles vaccine and Tdap at the pharmacy.  Also recommend flu shot and COVID-vaccine.            Follow Up   No follow-ups on file.  Patient was given instructions and counseling regarding his condition or for health maintenance advice. Please see specific information pulled into the AVS if appropriate.

## 2023-10-24 NOTE — ASSESSMENT & PLAN NOTE
Informed of availability of shingles vaccine and Tdap at the pharmacy.  Also recommend flu shot and COVID-vaccine.

## 2023-10-24 NOTE — ASSESSMENT & PLAN NOTE
He did make it back into vascular surgery [facility], and I reviewed that note.  They have asked him to continue with annual surveillance and follow-up.

## 2023-10-24 NOTE — ASSESSMENT & PLAN NOTE
Blood pressure running a little higher than I like to see.  Regarding add lisinopril 10 mg daily to his current regimen which will also add an element of renal protection

## 2023-12-18 ENCOUNTER — OFFICE VISIT (OUTPATIENT)
Dept: FAMILY MEDICINE CLINIC | Age: 78
End: 2023-12-18
Payer: MEDICARE

## 2023-12-18 ENCOUNTER — LAB (OUTPATIENT)
Dept: LAB | Facility: HOSPITAL | Age: 78
End: 2023-12-18
Payer: MEDICARE

## 2023-12-18 VITALS
TEMPERATURE: 98.2 F | SYSTOLIC BLOOD PRESSURE: 137 MMHG | HEIGHT: 75 IN | DIASTOLIC BLOOD PRESSURE: 83 MMHG | BODY MASS INDEX: 24.52 KG/M2 | HEART RATE: 80 BPM | OXYGEN SATURATION: 97 % | WEIGHT: 197.2 LBS

## 2023-12-18 DIAGNOSIS — R50.9 FEVER, UNSPECIFIED FEVER CAUSE: ICD-10-CM

## 2023-12-18 DIAGNOSIS — L03.319 CELLULITIS OF TRUNK, UNSPECIFIED SITE OF TRUNK: ICD-10-CM

## 2023-12-18 DIAGNOSIS — L03.319 CELLULITIS OF TRUNK, UNSPECIFIED SITE OF TRUNK: Primary | ICD-10-CM

## 2023-12-18 LAB
DEPRECATED RDW RBC AUTO: 37.9 FL (ref 37–54)
EOSINOPHIL # BLD MANUAL: 0.13 10*3/MM3 (ref 0–0.4)
EOSINOPHIL NFR BLD MANUAL: 4 % (ref 0.3–6.2)
ERYTHROCYTE [DISTWIDTH] IN BLOOD BY AUTOMATED COUNT: 12.8 % (ref 12.3–15.4)
ERYTHROCYTE [SEDIMENTATION RATE] IN BLOOD: 15 MM/HR (ref 0–20)
EXPIRATION DATE: NORMAL
FLUAV AG UPPER RESP QL IA.RAPID: NOT DETECTED
FLUBV AG UPPER RESP QL IA.RAPID: NOT DETECTED
HCT VFR BLD AUTO: 36.7 % (ref 37.5–51)
HGB BLD-MCNC: 13.1 G/DL (ref 13–17.7)
INTERNAL CONTROL: NORMAL
LYMPHOCYTES # BLD MANUAL: 0.77 10*3/MM3 (ref 0.7–3.1)
LYMPHOCYTES NFR BLD MANUAL: 7 % (ref 5–12)
Lab: NORMAL
MCH RBC QN AUTO: 29.4 PG (ref 26.6–33)
MCHC RBC AUTO-ENTMCNC: 35.7 G/DL (ref 31.5–35.7)
MCV RBC AUTO: 82.3 FL (ref 79–97)
METAMYELOCYTES NFR BLD MANUAL: 2 % (ref 0–0)
MONOCYTES # BLD: 0.22 10*3/MM3 (ref 0.1–0.9)
NEUTROPHILS # BLD AUTO: 2.02 10*3/MM3 (ref 1.7–7)
NEUTROPHILS NFR BLD MANUAL: 57 % (ref 42.7–76)
NEUTS BAND NFR BLD MANUAL: 6 % (ref 0–5)
PLAT MORPH BLD: NORMAL
PLATELET # BLD AUTO: 136 10*3/MM3 (ref 140–450)
PMV BLD AUTO: 11.5 FL (ref 6–12)
RBC # BLD AUTO: 4.46 10*6/MM3 (ref 4.14–5.8)
RBC MORPH BLD: NORMAL
SARS-COV-2 AG UPPER RESP QL IA.RAPID: NOT DETECTED
VARIANT LYMPHS NFR BLD MANUAL: 24 % (ref 19.6–45.3)
WBC MORPH BLD: NORMAL
WBC NRBC COR # BLD AUTO: 3.21 10*3/MM3 (ref 3.4–10.8)

## 2023-12-18 PROCEDURE — 85007 BL SMEAR W/DIFF WBC COUNT: CPT

## 2023-12-18 PROCEDURE — 1160F RVW MEDS BY RX/DR IN RCRD: CPT | Performed by: NURSE PRACTITIONER

## 2023-12-18 PROCEDURE — 36415 COLL VENOUS BLD VENIPUNCTURE: CPT

## 2023-12-18 PROCEDURE — 3079F DIAST BP 80-89 MM HG: CPT | Performed by: NURSE PRACTITIONER

## 2023-12-18 PROCEDURE — 87428 SARSCOV & INF VIR A&B AG IA: CPT | Performed by: NURSE PRACTITIONER

## 2023-12-18 PROCEDURE — 3075F SYST BP GE 130 - 139MM HG: CPT | Performed by: NURSE PRACTITIONER

## 2023-12-18 PROCEDURE — 1159F MED LIST DOCD IN RCRD: CPT | Performed by: NURSE PRACTITIONER

## 2023-12-18 PROCEDURE — 85652 RBC SED RATE AUTOMATED: CPT

## 2023-12-18 PROCEDURE — 99213 OFFICE O/P EST LOW 20 MIN: CPT | Performed by: NURSE PRACTITIONER

## 2023-12-18 PROCEDURE — 85025 COMPLETE CBC W/AUTO DIFF WBC: CPT

## 2023-12-18 RX ORDER — DOXYCYCLINE 100 MG/1
100 CAPSULE ORAL 2 TIMES DAILY
Qty: 20 CAPSULE | Refills: 0 | Status: SHIPPED | OUTPATIENT
Start: 2023-12-18 | End: 2023-12-28

## 2023-12-18 NOTE — PROGRESS NOTES
Chief Complaint  Judson Ochoa presents to [facility] for Rash (Painful redness on back & abdomen X 1 day ), Fever (Low grade fever at night, pt wakes up sweating X 3 days ), Fatigue (Weakness & fatigue X 3 days ), and Anorexia (Loss of appetite X 3 days )      Subjective     History of Present Illness  Rash developed over unknown period of time but noticed in the shower this morning.  Burning sensation over the left side of his abdomen and noticed enlarged red area that was painful.  He denies any recent known bite, however, he has been hunting in the recent weeks.  He does also feel feverish (100.1 this morning - treated with tylenol/ibuprofen), fatigue, sweating and loss of appetite for 3 days.      Assessment and Plan       Diagnoses and all orders for this visit:    1. Cellulitis of trunk, unspecified site of trunk (Primary)  Comments:  appears to be cellultis from unknown source.  Edges marked, advised patient if extends outside marked area, return to office or to ER- start abx today  Orders:  -     CBC & Differential; Future  -     Sedimentation rate, automated; Future  -     doxycycline (MONODOX) 100 MG capsule; Take 1 capsule by mouth 2 (Two) Times a Day for 10 days.  Dispense: 20 capsule; Refill: 0    2. Fever, unspecified fever cause  Comments:  labs , tylenol / ibuprofen  Orders:  -     POCT SARS-CoV-2 Antigen FAINA + Flu  -     CBC & Differential; Future  -     Sedimentation rate, automated; Future            Follow Up   Return if symptoms worsen or fail to improve.  Future Appointments   Date Time Provider Department Center   4/26/2024  9:30 AM Jose A Bell MD [facility]       New Medications Ordered This Visit   Medications    doxycycline (MONODOX) 100 MG capsule     Sig: Take 1 capsule by mouth 2 (Two) Times a Day for 10 days.     Dispense:  20 capsule     Refill:  0       There are no discontinued medications.       Review of Systems  "  Constitutional:  Positive for appetite change, fatigue and fever (100 this morning;  took 2 iburofen this morning).   Respiratory:  Negative for cough and shortness of breath.    Gastrointestinal:  Positive for nausea. Negative for diarrhea.   Skin:  Positive for color change (left flank/ left lateral mid abdomen (SEE PHOTO)) and rash (left lateral flank).       Objective     Vitals:    12/18/23 1452   BP: 137/83   BP Location: Left arm   Patient Position: Sitting   Cuff Size: Adult   Pulse: 80   Temp: 98.2 °F (36.8 °C)   TempSrc: Oral   SpO2: 97%   Weight: 89.4 kg (197 lb 3.2 oz)   Height: 190.5 cm (75\")     Body mass index is 24.65 kg/m².   BMI is within normal parameters. No other follow-up for BMI required.      Physical Exam  Vitals reviewed.   Constitutional:       Appearance: Normal appearance.   HENT:      Head: Normocephalic.   Eyes:      Pupils: Pupils are equal, round, and reactive to light.   Cardiovascular:      Rate and Rhythm: Normal rate and regular rhythm.      Heart sounds: No murmur heard.  Pulmonary:      Effort: Pulmonary effort is normal.      Breath sounds: Normal breath sounds.   Musculoskeletal:         General: Normal range of motion.   Skin:     Findings: Erythema present.             Comments: Location of erythema - edges marked - small discoloration in center of large area (SEE PHOTO)   Neurological:      Mental Status: He is alert.   Psychiatric:         Mood and Affect: Mood normal.         Behavior: Behavior normal.            Result Review               No Known Allergies   Past Medical History:   Diagnosis Date    Abdominal aortic aneurysm without rupture     Anemia     Decreased white blood cell count     Elevated prostate specific antigen (PSA)     Hematospermia     Low back pain     Male erectile disorder     Male erectile dysfunction     Mixed hyperlipidemia     Neoplasm of uncertain behavior of left kidney     Nicotine dependence     Obstructive sleep apnea (adult) " (pediatric)     Palmar fascial fibromatosis     Peripheral vascular disease     Type 2 diabetes mellitus without complication      Current Outpatient Medications   Medication Sig Dispense Refill    amLODIPine (NORVASC) 5 MG tablet Take 1 tablet by mouth Daily. 90 tablet 1    aspirin 81 MG EC tablet Take 1 tablet by mouth Daily. 30 tablet 11    fenofibrate (TRICOR) 145 MG tablet Take 1 tablet by mouth Daily. 90 tablet 1    lisinopril (PRINIVIL,ZESTRIL) 10 MG tablet Take 1 tablet by mouth Daily. 90 tablet 1    metFORMIN (GLUCOPHAGE) 500 MG tablet Take 1 tablet by mouth 3 (Three) Times a Day. 270 tablet 1    sildenafil (REVATIO) 20 MG tablet Take 1 tablet by mouth As Needed (one hour before sex). 20 tablet 0    doxycycline (MONODOX) 100 MG capsule Take 1 capsule by mouth 2 (Two) Times a Day for 10 days. 20 capsule 0     No current facility-administered medications for this visit.     Past Surgical History:   Procedure Laterality Date    ABDOMINAL AORTIC ANEURYSM REPAIR  2018    endovascular stenting    CIRCUMCISION  2005    COLONOSCOPY  10/2007    normal      Health Maintenance Due   Topic Date Due    COVID-19 Vaccine (1) Never done    Pneumococcal Vaccine 65+ (1 - PCV) Never done    ZOSTER VACCINE (1 of 2) Never done    LUNG CANCER SCREENING  Never done    TDAP/TD VACCINES (3 - Td or Tdap) 03/31/2022    URINE MICROALBUMIN  10/28/2023    DIABETIC EYE EXAM  11/16/2023      Immunization History   Administered Date(s) Administered    Tdap 03/21/2012, 03/31/2012         Part of this note may be an electronic transcription/translation of spoken language to printed   text using the Dragon Dictation System.      CARIDAD Damian

## 2024-04-23 DIAGNOSIS — E78.2 MIXED HYPERLIPIDEMIA: ICD-10-CM

## 2024-04-23 DIAGNOSIS — I10 HYPERTENSION, ESSENTIAL: ICD-10-CM

## 2024-04-23 DIAGNOSIS — E11.9 TYPE 2 DIABETES MELLITUS WITHOUT COMPLICATION, WITHOUT LONG-TERM CURRENT USE OF INSULIN: ICD-10-CM

## 2024-04-23 RX ORDER — AMLODIPINE BESYLATE 5 MG/1
5 TABLET ORAL DAILY
Qty: 90 TABLET | Refills: 1 | Status: SHIPPED | OUTPATIENT
Start: 2024-04-23

## 2024-04-23 RX ORDER — FENOFIBRATE 145 MG/1
145 TABLET, COATED ORAL DAILY
Qty: 90 TABLET | Refills: 1 | Status: SHIPPED | OUTPATIENT
Start: 2024-04-23

## 2024-04-23 RX ORDER — LISINOPRIL 10 MG/1
10 TABLET ORAL DAILY
Qty: 90 TABLET | Refills: 1 | Status: SHIPPED | OUTPATIENT
Start: 2024-04-23

## 2024-04-26 ENCOUNTER — TELEPHONE (OUTPATIENT)
Dept: FAMILY MEDICINE CLINIC | Age: 79
End: 2024-04-26

## 2024-04-26 ENCOUNTER — LAB (OUTPATIENT)
Dept: LAB | Facility: HOSPITAL | Age: 79
End: 2024-04-26
Payer: MEDICARE

## 2024-04-26 ENCOUNTER — OFFICE VISIT (OUTPATIENT)
Dept: FAMILY MEDICINE CLINIC | Age: 79
End: 2024-04-26
Payer: MEDICARE

## 2024-04-26 VITALS
BODY MASS INDEX: 24.54 KG/M2 | WEIGHT: 197.4 LBS | HEART RATE: 67 BPM | DIASTOLIC BLOOD PRESSURE: 65 MMHG | HEIGHT: 75 IN | TEMPERATURE: 98 F | SYSTOLIC BLOOD PRESSURE: 136 MMHG

## 2024-04-26 DIAGNOSIS — E11.9 TYPE 2 DIABETES MELLITUS WITHOUT COMPLICATION, WITHOUT LONG-TERM CURRENT USE OF INSULIN: ICD-10-CM

## 2024-04-26 DIAGNOSIS — I10 HYPERTENSION, ESSENTIAL: ICD-10-CM

## 2024-04-26 DIAGNOSIS — Z12.5 SCREENING FOR PROSTATE CANCER: ICD-10-CM

## 2024-04-26 DIAGNOSIS — E78.2 MIXED HYPERLIPIDEMIA: ICD-10-CM

## 2024-04-26 DIAGNOSIS — Z00.00 MEDICARE ANNUAL WELLNESS VISIT, SUBSEQUENT: Primary | ICD-10-CM

## 2024-04-26 LAB
ALBUMIN SERPL-MCNC: 4.3 G/DL (ref 3.5–5.2)
ALBUMIN UR-MCNC: 1.6 MG/DL
ALBUMIN/GLOB SERPL: 1.4 G/DL
ALP SERPL-CCNC: 50 U/L (ref 39–117)
ALT SERPL W P-5'-P-CCNC: 9 U/L (ref 1–41)
ANION GAP SERPL CALCULATED.3IONS-SCNC: 10 MMOL/L (ref 5–15)
AST SERPL-CCNC: 13 U/L (ref 1–40)
BASOPHILS # BLD AUTO: 0.01 10*3/MM3 (ref 0–0.2)
BASOPHILS NFR BLD AUTO: 0.3 % (ref 0–1.5)
BILIRUB SERPL-MCNC: 0.5 MG/DL (ref 0–1.2)
BUN SERPL-MCNC: 20 MG/DL (ref 8–23)
BUN/CREAT SERPL: 16.9 (ref 7–25)
CALCIUM SPEC-SCNC: 9.4 MG/DL (ref 8.6–10.5)
CHLORIDE SERPL-SCNC: 105 MMOL/L (ref 98–107)
CHOLEST SERPL-MCNC: 157 MG/DL (ref 0–200)
CO2 SERPL-SCNC: 25 MMOL/L (ref 22–29)
CREAT SERPL-MCNC: 1.18 MG/DL (ref 0.76–1.27)
CREAT UR-MCNC: 109.4 MG/DL
DEPRECATED RDW RBC AUTO: 43.3 FL (ref 37–54)
EGFRCR SERPLBLD CKD-EPI 2021: 64 ML/MIN/1.73
EOSINOPHIL # BLD AUTO: 0.14 10*3/MM3 (ref 0–0.4)
EOSINOPHIL NFR BLD AUTO: 3.5 % (ref 0.3–6.2)
ERYTHROCYTE [DISTWIDTH] IN BLOOD BY AUTOMATED COUNT: 13.7 % (ref 12.3–15.4)
GLOBULIN UR ELPH-MCNC: 3 GM/DL
GLUCOSE SERPL-MCNC: 156 MG/DL (ref 65–99)
HBA1C MFR BLD: 8.3 % (ref 4.8–5.6)
HCT VFR BLD AUTO: 39.7 % (ref 37.5–51)
HDLC SERPL-MCNC: 52 MG/DL (ref 40–60)
HGB BLD-MCNC: 13.2 G/DL (ref 13–17.7)
IMM GRANULOCYTES # BLD AUTO: 0.01 10*3/MM3 (ref 0–0.05)
IMM GRANULOCYTES NFR BLD AUTO: 0.3 % (ref 0–0.5)
LDLC SERPL CALC-MCNC: 92 MG/DL (ref 0–100)
LDLC/HDLC SERPL: 1.77 {RATIO}
LYMPHOCYTES # BLD AUTO: 1.51 10*3/MM3 (ref 0.7–3.1)
LYMPHOCYTES NFR BLD AUTO: 37.8 % (ref 19.6–45.3)
MCH RBC QN AUTO: 28.5 PG (ref 26.6–33)
MCHC RBC AUTO-ENTMCNC: 33.2 G/DL (ref 31.5–35.7)
MCV RBC AUTO: 85.7 FL (ref 79–97)
MICROALBUMIN/CREAT UR: 14.6 MG/G (ref 0–29)
MONOCYTES # BLD AUTO: 0.56 10*3/MM3 (ref 0.1–0.9)
MONOCYTES NFR BLD AUTO: 14 % (ref 5–12)
NEUTROPHILS NFR BLD AUTO: 1.76 10*3/MM3 (ref 1.7–7)
NEUTROPHILS NFR BLD AUTO: 44.1 % (ref 42.7–76)
PLATELET # BLD AUTO: 194 10*3/MM3 (ref 140–450)
PMV BLD AUTO: 9.2 FL (ref 6–12)
POTASSIUM SERPL-SCNC: 4.4 MMOL/L (ref 3.5–5.2)
PROT SERPL-MCNC: 7.3 G/DL (ref 6–8.5)
PSA SERPL-MCNC: 8.74 NG/ML (ref 0–4)
RBC # BLD AUTO: 4.63 10*6/MM3 (ref 4.14–5.8)
SODIUM SERPL-SCNC: 140 MMOL/L (ref 136–145)
TRIGL SERPL-MCNC: 65 MG/DL (ref 0–150)
VLDLC SERPL-MCNC: 13 MG/DL (ref 5–40)
WBC NRBC COR # BLD AUTO: 3.99 10*3/MM3 (ref 3.4–10.8)

## 2024-04-26 PROCEDURE — G0103 PSA SCREENING: HCPCS | Performed by: FAMILY MEDICINE

## 2024-04-26 PROCEDURE — 85025 COMPLETE CBC W/AUTO DIFF WBC: CPT | Performed by: FAMILY MEDICINE

## 2024-04-26 PROCEDURE — 83036 HEMOGLOBIN GLYCOSYLATED A1C: CPT | Performed by: FAMILY MEDICINE

## 2024-04-26 PROCEDURE — 82043 UR ALBUMIN QUANTITATIVE: CPT | Performed by: FAMILY MEDICINE

## 2024-04-26 PROCEDURE — 82570 ASSAY OF URINE CREATININE: CPT | Performed by: FAMILY MEDICINE

## 2024-04-26 PROCEDURE — 36415 COLL VENOUS BLD VENIPUNCTURE: CPT | Performed by: FAMILY MEDICINE

## 2024-04-26 PROCEDURE — 80061 LIPID PANEL: CPT | Performed by: FAMILY MEDICINE

## 2024-04-26 PROCEDURE — 80053 COMPREHEN METABOLIC PANEL: CPT | Performed by: FAMILY MEDICINE

## 2024-04-26 NOTE — ASSESSMENT & PLAN NOTE
Blood pressure is okay continue on current regimen.  He should be taking both the amlodipine and the lisinopril.

## 2024-04-26 NOTE — ASSESSMENT & PLAN NOTE
We reviewed the preventive service recommendations and created an individualized handout. Informed of availability of shingles vaccine and Tdap at the pharmacy

## 2024-04-26 NOTE — PROGRESS NOTES
The ABCs of the Annual Wellness Visit  Subsequent Medicare Wellness Visit    Subjective    Judson Ochoa is a 78 y.o. male who presents for a Subsequent Medicare Wellness Visit.    The following portions of the patient's history were reviewed and   updated as appropriate: allergies, current medications, past family history, past medical history, past social history, past surgical history, and problem list.    Compared to one year ago, the patient feels his physical   health is the same.    Compared to one year ago, the patient feels his mental   health is the same.    Recent Hospitalizations:  He was not admitted to the hospital during the last year.       Current Medical Providers:  Patient Care Team:  Jose A Bell MD as PCP - General (Family Medicine)  Angel Ruvalcaba MD (Vascular Surgery)    Outpatient Medications Prior to Visit   Medication Sig Dispense Refill    amLODIPine (NORVASC) 5 MG tablet TAKE 1 TABLET BY MOUTH DAILY 90 tablet 1    fenofibrate (TRICOR) 145 MG tablet TAKE 1 TABLET BY MOUTH DAILY 90 tablet 1    lisinopril (PRINIVIL,ZESTRIL) 10 MG tablet TAKE 1 TABLET BY MOUTH DAILY 90 tablet 1    metFORMIN (GLUCOPHAGE) 500 MG tablet TAKE 1 TABLET BY MOUTH THREE TIMES DAILY 270 tablet 1    sildenafil (REVATIO) 20 MG tablet Take 1 tablet by mouth As Needed (one hour before sex). 20 tablet 0     No facility-administered medications prior to visit.       No opioid medication identified on active medication list. I have reviewed chart for other potential  high risk medication/s and harmful drug interactions in the elderly.        Aspirin is not on active medication list.  Aspirin use is not indicated based on review of current medical condition/s. Risk of harm outweighs potential benefits.  .    Patient Active Problem List   Diagnosis    Cigarette smoker    Abdominal aortic aneurysm without rupture    Peripheral vascular disease, unspecified    Type 2 diabetes mellitus without complication,  "without long-term current use of insulin    Mixed hyperlipidemia    Paresthesia of upper extremity    EASTON on CPAP    PSA elevation    Chronic midline low back pain    Obstructive sleep apnea (adult) (pediatric)    Hypertension, essential    Medicare annual wellness visit, subsequent    History of fall    Vasculogenic erectile dysfunction    Need for vaccination     Advance Care Planning   Advance Care Planning     Advance Directive is not on file.  ACP discussion was held with the patient during this visit. Patient does not have an advance directive, information provided.     Objective    Vitals:    24 0927   BP: 136/65   BP Location: Right arm   Patient Position: Sitting   Pulse: 67   Temp: 98 °F (36.7 °C)   TempSrc: Temporal   Weight: 89.5 kg (197 lb 6.4 oz)   Height: 190.5 cm (75\")     Estimated body mass index is 24.67 kg/m² as calculated from the following:    Height as of this encounter: 190.5 cm (75\").    Weight as of this encounter: 89.5 kg (197 lb 6.4 oz).    BMI is within normal parameters. No other follow-up for BMI required.      Does the patient have evidence of cognitive impairment? No          HEALTH RISK ASSESSMENT    Smoking Status:  Social History     Tobacco Use   Smoking Status Every Day    Current packs/day: 1.00    Average packs/day: 1 pack/day for 60.0 years (60.0 ttl pk-yrs)    Types: Cigarettes   Smokeless Tobacco Never     Alcohol Consumption:  Social History     Substance and Sexual Activity   Alcohol Use Yes    Comment: occasional     Fall Risk Screen:    REBECAADI Fall Risk Assessment was completed, and patient is at LOW risk for falls.Assessment completed on:2024    Depression Screenin/26/2024     9:25 AM   PHQ-2/PHQ-9 Depression Screening   Little Interest or Pleasure in Doing Things 0-->not at all   Feeling Down, Depressed or Hopeless 0-->not at all   PHQ-9: Brief Depression Severity Measure Score 0       Health Habits and Functional and Cognitive Screening:      " 4/26/2024     9:26 AM   Functional & Cognitive Status   Do you have difficulty preparing food and eating? No   Do you have difficulty bathing yourself, getting dressed or grooming yourself? No   Do you have difficulty using the toilet? No   Do you have difficulty moving around from place to place? No   Do you have trouble with steps or getting out of a bed or a chair? No   Current Diet Well Balanced Diet   Dental Exam Up to date   Eye Exam Up to date   Exercise (times per week) 0 times per week   Current Exercises Include No Regular Exercise   Do you need help using the phone?  No   Are you deaf or do you have serious difficulty hearing?  Yes   Do you need help to go to places out of walking distance? No   Do you need help shopping? No   Do you need help preparing meals?  No   Do you need help with housework?  No   Do you need help with laundry? No   Do you need help taking your medications? No   Do you need help managing money? No   Do you ever drive or ride in a car without wearing a seat belt? No   Have you felt unusual stress, anger or loneliness in the last month? No   Who do you live with? Spouse   If you need help, do you have trouble finding someone available to you? No   Have you been bothered in the last four weeks by sexual problems? No   Do you have difficulty concentrating, remembering or making decisions? No       Age-appropriate Screening Schedule:  Refer to the list below for future screening recommendations based on patient's age, sex and/or medical conditions. Orders for these recommended tests are listed in the plan section. The patient has been provided with a written plan.    Health Maintenance   Topic Date Due    Pneumococcal Vaccine 65+ (1 of 2 - PCV) Never done    ZOSTER VACCINE (1 of 2) Never done    LUNG CANCER SCREENING  Never done    RSV Vaccine - Adults (1 - 1-dose 60+ series) Never done    TDAP/TD VACCINES (3 - Td or Tdap) 03/31/2022    COVID-19 Vaccine (1 - 2023-24 season) Never done  "   URINE MICROALBUMIN  10/28/2023    DIABETIC EYE EXAM  11/16/2023    ANNUAL WELLNESS VISIT  04/24/2024    HEMOGLOBIN A1C  04/24/2024    INFLUENZA VACCINE  08/01/2024    LIPID PANEL  10/24/2024    HEPATITIS C SCREENING  Completed    COLORECTAL CANCER SCREENING  Discontinued                  CMS Preventative Services Quick Reference  Risk Factors Identified During Encounter  None Identified  The above risks/problems have been discussed with the patient.  Pertinent information has been shared with the patient in the After Visit Summary.  An After Visit Summary and PPPS were made available to the patient.    Follow Up:   Next Medicare Wellness visit to be scheduled in 1 year.       Additional E&M Note during same encounter follows:  Patient has multiple medical problems which are significant and separately identifiable that require additional work above and beyond the Medicare Wellness Visit.      Chief Complaint  Medicare Wellness-subsequent, Hypertension, and Diabetes    Subjective        HPI  Judson Ochoa is also being seen today for other health issues as noted below    Did add lisinopril 10 mg daily to his regimen at last office visit.  He has tolerated the medicine okay.  He does state that he ran out of one of his blood pressure pills has not picked refills at the pharmacy yet.    Diabetes is generally pretty good.  His last hemoglobin A1c 7.3.  Checks his blood sugars at home infrequently.     He did undergo bilateral cataract surgery    He did follow-up with vascular surgery, [facility], Dr. Ruvalcaba.  Basically got a good report and is to follow-up again in 1 year.    He reports that he does get a swishing sound in his ears at times.  States that he mentioned it to Dr. Ruvalcaba and has ordered carotid ultrasounds.    Objective   Vital Signs:  /65 (BP Location: Right arm, Patient Position: Sitting)   Pulse 67   Temp 98 °F (36.7 °C) (Temporal)   Ht 190.5 cm (75\")   Wt 89.5 kg (197 lb 6.4 oz)   BMI 24.67 " kg/m²     Physical Exam     No acute distress  Color is good  Eyes are nonicteric  Tympanic membranes are clear  Oral pharynx is clear  No cervical or supraclavicular adenopathy  Carotids without bruit  Heart is regular rate and rhythm 2/6 systolic murmur  Lungs are clear  Abdomen bowel sounds positive, soft, nontender, no mass, no organomegaly  Lower extremities are without edema  He does have some hammertoe deformities bilaterally  Monofilament testing is poor with 2 out of 5 bilaterally.  Both sides sensation was better proximally than distally on the foot  There are no ulcerations or worrisome calluses.  Dorsalis pedis posterior tibial pulses were palpable      The following data was reviewed by: Jose A Bell MD on 04/26/2024:          CBC Auto Differential (10/24/2023 10:54)  Comprehensive Metabolic Panel (10/24/2023 10:54)  Lipid Panel (10/24/2023 10:54)  Hemoglobin A1c (10/24/2023 10:54)     Assessment and Plan   Diagnoses and all orders for this visit:    1. Medicare annual wellness visit, subsequent (Primary)  Assessment & Plan:  We reviewed the preventive service recommendations and created an individualized handout. Informed of availability of shingles vaccine and Tdap at the pharmacy        2. Type 2 diabetes mellitus without complication, without long-term current use of insulin  -     Hemoglobin A1c  -     CBC Auto Differential  -     Microalbumin / Creatinine Urine Ratio - Urine, Clean Catch    3. Hypertension, essential  Assessment & Plan:  Blood pressure is okay continue on current regimen.  He should be taking both the amlodipine and the lisinopril.      Orders:  -     Comprehensive Metabolic Panel    4. Mixed hyperlipidemia  Assessment & Plan:  Check lab predicate medication change based on result     Orders:  -     Lipid Panel    5. Screening for prostate cancer  -     PSA Screen             Follow Up   No follow-ups on file.  Patient was given instructions and counseling regarding his  condition or for health maintenance advice. Please see specific information pulled into the AVS if appropriate.

## 2024-05-13 ENCOUNTER — OFFICE VISIT (OUTPATIENT)
Dept: FAMILY MEDICINE CLINIC | Age: 79
End: 2024-05-13
Payer: MEDICARE

## 2024-05-13 VITALS
OXYGEN SATURATION: 99 % | BODY MASS INDEX: 24.12 KG/M2 | WEIGHT: 194 LBS | DIASTOLIC BLOOD PRESSURE: 66 MMHG | HEIGHT: 75 IN | HEART RATE: 66 BPM | SYSTOLIC BLOOD PRESSURE: 123 MMHG

## 2024-05-13 DIAGNOSIS — E11.9 TYPE 2 DIABETES MELLITUS WITHOUT COMPLICATION, WITHOUT LONG-TERM CURRENT USE OF INSULIN: Primary | ICD-10-CM

## 2024-05-13 DIAGNOSIS — R97.20 PSA ELEVATION: ICD-10-CM

## 2024-05-13 PROCEDURE — 3074F SYST BP LT 130 MM HG: CPT | Performed by: FAMILY MEDICINE

## 2024-05-13 PROCEDURE — 99214 OFFICE O/P EST MOD 30 MIN: CPT | Performed by: FAMILY MEDICINE

## 2024-05-13 PROCEDURE — G2211 COMPLEX E/M VISIT ADD ON: HCPCS | Performed by: FAMILY MEDICINE

## 2024-05-13 PROCEDURE — 3078F DIAST BP <80 MM HG: CPT | Performed by: FAMILY MEDICINE

## 2024-05-13 PROCEDURE — 1126F AMNT PAIN NOTED NONE PRSNT: CPT | Performed by: FAMILY MEDICINE

## 2024-05-13 RX ORDER — SULFAMETHOXAZOLE AND TRIMETHOPRIM 800; 160 MG/1; MG/1
1 TABLET ORAL 2 TIMES DAILY
Qty: 28 TABLET | Refills: 0 | Status: SHIPPED | OUTPATIENT
Start: 2024-05-13

## 2024-05-13 NOTE — ASSESSMENT & PLAN NOTE
I will go ahead and put him on a round of antibiotics and have him get a repeat PSA after completion of the antibiotics.  Go ahead and place referral for urology as well.

## 2024-05-13 NOTE — PROGRESS NOTES
"Chief Complaint  Results (Follow up lab results )    Subjective          Judson Ochoa presents to [facility]  History of Present Illness    He is accompanied by his wife to the visit today.    Here to review the results of his recent lab work    Hemoglobin A1c was quite elevated at 8.3.  He said that he had really not been doing much way of exercise not following a great diet lately.  Since he received the lab results he has been more active working in the garden.  Has been monitoring his blood sugars getting what sounds like really good numbers below 110 for the past 3 checks.  He tends to check around 11 AM after he has been in the garden.  His wife corroborates.  They prefer to give him an opportunity to bring his sugars under control before changing his medication.    Labs also showed an elevation in his PSA.  Talked about possible implications.  He states he has had enlarged prostate since he was in his 40s.  He is not real enthusiastic about pursuing evaluation but does consent to see urology.          Current Outpatient Medications on File Prior to Visit   Medication Sig Dispense Refill    amLODIPine (NORVASC) 5 MG tablet TAKE 1 TABLET BY MOUTH DAILY 90 tablet 1    fenofibrate (TRICOR) 145 MG tablet TAKE 1 TABLET BY MOUTH DAILY 90 tablet 1    lisinopril (PRINIVIL,ZESTRIL) 10 MG tablet TAKE 1 TABLET BY MOUTH DAILY 90 tablet 1    metFORMIN (GLUCOPHAGE) 500 MG tablet TAKE 1 TABLET BY MOUTH THREE TIMES DAILY 270 tablet 1    sildenafil (REVATIO) 20 MG tablet Take 1 tablet by mouth As Needed (one hour before sex). 20 tablet 0     No current facility-administered medications on file prior to visit.       Review of Systems         Objective   Vital Signs:   /66 (BP Location: Left arm, Patient Position: Sitting, Cuff Size: Adult)   Pulse 66   Ht 190.5 cm (75\")   Wt 88 kg (194 lb)   SpO2 99%   BMI 24.25 kg/m²     Physical Exam     No acute distress  No labored " breathing  Does not appear overly anxious about the lab results      Result Review :                     Assessment and Plan    Diagnoses and all orders for this visit:    1. Type 2 diabetes mellitus without complication, without long-term current use of insulin (Primary)  Assessment & Plan:  Told him and his wife that I am fine with him trying to bring sugars under control with improved diet and lifestyle changes.  However I do want to hold him accountable.  Ask him to check fasting blood sugars 3 days a week and provide me with a log of those readings after couple of weeks.  If his hemoglobin A1c is not substantially improved by next blood draw then I would press harder about adding medication       2. PSA elevation  Assessment & Plan:  I will go ahead and put him on a round of antibiotics and have him get a repeat PSA after completion of the antibiotics.  Go ahead and place referral for urology as well.    Orders:  -     Ambulatory Referral to Urology  -     PSA Diagnostic; Future  -     sulfamethoxazole-trimethoprim (Bactrim DS) 800-160 MG per tablet; Take 1 tablet by mouth 2 (Two) Times a Day.  Dispense: 28 tablet; Refill: 0      I spent 30 minutes caring for Judson on this date of service. This time includes time spent by me in the following activities:preparing for the visit, reviewing tests, performing a medically appropriate examination and/or evaluation , counseling and educating the patient/family/caregiver, ordering medications, tests, or procedures, and documenting information in the medical record  Follow Up   No follow-ups on file.  Patient was given instructions and counseling regarding his condition or for health maintenance advice. Please see specific information pulled into the AVS if appropriate.

## 2024-05-13 NOTE — ASSESSMENT & PLAN NOTE
Told him and his wife that I am fine with him trying to bring sugars under control with improved diet and lifestyle changes.  However I do want to hold him accountable.  Ask him to check fasting blood sugars 3 days a week and provide me with a log of those readings after couple of weeks.  If his hemoglobin A1c is not substantially improved by next blood draw then I would press harder about adding medication

## 2024-05-15 ENCOUNTER — OFFICE VISIT (OUTPATIENT)
Dept: UROLOGY | Facility: CLINIC | Age: 79
End: 2024-05-15
Payer: MEDICARE

## 2024-05-15 VITALS
BODY MASS INDEX: 24.05 KG/M2 | HEIGHT: 75 IN | HEART RATE: 65 BPM | WEIGHT: 193.4 LBS | SYSTOLIC BLOOD PRESSURE: 122 MMHG | DIASTOLIC BLOOD PRESSURE: 59 MMHG | OXYGEN SATURATION: 98 %

## 2024-05-15 DIAGNOSIS — R97.20 ELEVATED PROSTATE SPECIFIC ANTIGEN (PSA): Primary | ICD-10-CM

## 2024-05-15 PROBLEM — E78.5 DYSLIPIDEMIA: Status: ACTIVE | Noted: 2023-02-22

## 2024-05-15 PROBLEM — I72.3 BILATERAL ILIAC ARTERY ANEURYSM: Status: ACTIVE | Noted: 2023-03-30

## 2024-05-15 PROBLEM — I72.4 ANEURYSM OF RIGHT POPLITEAL ARTERY: Status: ACTIVE | Noted: 2023-03-30

## 2024-05-15 NOTE — PROGRESS NOTES
Chief Complaint: Elevated PSA    Subjective         History of Present Illness  Judson Ochoa is a 78 y.o. male presents to [facility] to be seen for evaded PSA.    Patient saw his primary care provider on 5/13/2024 for follow-up and had labs prior to being seen.    The patient apparently had an abnormal PSA on screening starting approximately 3 years ago.    His PSA has been over 4 for some time.    PSA was performed 4/24/2023 and was noted to be 5.19 PSA was repeated on 4/26/2024 and was noted to be 8.74.    It appears that the patient has been seen by urology previously at [facility] for BPH and renal cyst.  Last seen in March 2021.    He states urinary stream is slower in the morning better during the day.     Nocturia x 1    Denies perineal pain.     No burning with urination.     He does have back pain that is chronic worse in the last several weeks after driving t posts into the ground.    He was told that his elevated psa may be related to an infection was given bactrim for 14 days starting yesterday.    No family hx of gu malignancies that he knows of but he has a cousin an younger brother that have had prostate removed.      He does have longevity in his family father lived to be 100 great aunt 110.    He is on no anticoagulants.      Objective     Past Medical History:   Diagnosis Date    Abdominal aortic aneurysm without rupture     Anemia     Arthritis     Decreased white blood cell count     Elevated prostate specific antigen (PSA)     Erectile dysfunction     Hematospermia     HL (hearing loss)     Hypertension     Low back pain     Male erectile disorder     Male erectile dysfunction     Mixed hyperlipidemia     Neoplasm of uncertain behavior of left kidney     Nicotine dependence     Obstructive sleep apnea (adult) (pediatric)     Palmar fascial fibromatosis     Peripheral vascular disease     Type 2 diabetes mellitus without complication        Past  "Surgical History:   Procedure Laterality Date    ABDOMINAL AORTIC ANEURYSM REPAIR  2018    endovascular stenting    CIRCUMCISION  2005    COLONOSCOPY  10/2007    normal    CYSTOSCOPY           Current Outpatient Medications:     amLODIPine (NORVASC) 5 MG tablet, TAKE 1 TABLET BY MOUTH DAILY, Disp: 90 tablet, Rfl: 1    fenofibrate (TRICOR) 145 MG tablet, TAKE 1 TABLET BY MOUTH DAILY, Disp: 90 tablet, Rfl: 1    lisinopril (PRINIVIL,ZESTRIL) 10 MG tablet, TAKE 1 TABLET BY MOUTH DAILY, Disp: 90 tablet, Rfl: 1    metFORMIN (GLUCOPHAGE) 500 MG tablet, TAKE 1 TABLET BY MOUTH THREE TIMES DAILY, Disp: 270 tablet, Rfl: 1    sildenafil (REVATIO) 20 MG tablet, Take 1 tablet by mouth As Needed (one hour before sex)., Disp: 20 tablet, Rfl: 0    sulfamethoxazole-trimethoprim (Bactrim DS) 800-160 MG per tablet, Take 1 tablet by mouth 2 (Two) Times a Day., Disp: 28 tablet, Rfl: 0    No Known Allergies     Family History   Problem Relation Age of Onset    Heart failure Mother         cause of death    Coronary artery disease Mother     Diabetes type II Mother     Diabetes Mother     Hearing loss Mother     Diabetes type II Father     Stroke Brother     Diabetes type I Brother        Social History     Socioeconomic History    Marital status:     Number of children: 1   Tobacco Use    Smoking status: Every Day     Current packs/day: 1.00     Average packs/day: 1 pack/day for 60.0 years (60.0 ttl pk-yrs)     Types: Cigarettes    Smokeless tobacco: Never   Vaping Use    Vaping status: Never Used   Substance and Sexual Activity    Alcohol use: Not Currently     Comment: occasional    Drug use: Never    Sexual activity: Never       Vital Signs:   /59 (BP Location: Right arm, Patient Position: Sitting, Cuff Size: Adult)   Pulse 65   Ht 190.5 cm (75\")   Wt 87.7 kg (193 lb 6.4 oz)   SpO2 98%   BMI 24.17 kg/m²      Physical Exam     Result Review :   The following data was reviewed by: CARIDAD Wilkes on " 05/15/2024:  Results for orders placed or performed in visit on 04/26/24   Hemoglobin A1c    Specimen: Blood   Result Value Ref Range    Hemoglobin A1C 8.30 (H) 4.80 - 5.60 %   Lipid Panel    Specimen: Blood   Result Value Ref Range    Total Cholesterol 157 0 - 200 mg/dL    Triglycerides 65 0 - 150 mg/dL    HDL Cholesterol 52 40 - 60 mg/dL    LDL Cholesterol  92 0 - 100 mg/dL    VLDL Cholesterol 13 5 - 40 mg/dL    LDL/HDL Ratio 1.77    Comprehensive Metabolic Panel    Specimen: Blood   Result Value Ref Range    Glucose 156 (H) 65 - 99 mg/dL    BUN 20 8 - 23 mg/dL    Creatinine 1.18 0.76 - 1.27 mg/dL    Sodium 140 136 - 145 mmol/L    Potassium 4.4 3.5 - 5.2 mmol/L    Chloride 105 98 - 107 mmol/L    CO2 25.0 22.0 - 29.0 mmol/L    Calcium 9.4 8.6 - 10.5 mg/dL    Total Protein 7.3 6.0 - 8.5 g/dL    Albumin 4.3 3.5 - 5.2 g/dL    ALT (SGPT) 9 1 - 41 U/L    AST (SGOT) 13 1 - 40 U/L    Alkaline Phosphatase 50 39 - 117 U/L    Total Bilirubin 0.5 0.0 - 1.2 mg/dL    Globulin 3.0 gm/dL    A/G Ratio 1.4 g/dL    BUN/Creatinine Ratio 16.9 7.0 - 25.0    Anion Gap 10.0 5.0 - 15.0 mmol/L    eGFR 64.0 >60.0 mL/min/1.73   CBC Auto Differential    Specimen: Blood   Result Value Ref Range    WBC 3.99 3.40 - 10.80 10*3/mm3    RBC 4.63 4.14 - 5.80 10*6/mm3    Hemoglobin 13.2 13.0 - 17.7 g/dL    Hematocrit 39.7 37.5 - 51.0 %    MCV 85.7 79.0 - 97.0 fL    MCH 28.5 26.6 - 33.0 pg    MCHC 33.2 31.5 - 35.7 g/dL    RDW 13.7 12.3 - 15.4 %    RDW-SD 43.3 37.0 - 54.0 fl    MPV 9.2 6.0 - 12.0 fL    Platelets 194 140 - 450 10*3/mm3    Neutrophil % 44.1 42.7 - 76.0 %    Lymphocyte % 37.8 19.6 - 45.3 %    Monocyte % 14.0 (H) 5.0 - 12.0 %    Eosinophil % 3.5 0.3 - 6.2 %    Basophil % 0.3 0.0 - 1.5 %    Immature Grans % 0.3 0.0 - 0.5 %    Neutrophils, Absolute 1.76 1.70 - 7.00 10*3/mm3    Lymphocytes, Absolute 1.51 0.70 - 3.10 10*3/mm3    Monocytes, Absolute 0.56 0.10 - 0.90 10*3/mm3    Eosinophils, Absolute 0.14 0.00 - 0.40 10*3/mm3    Basophils,  Absolute 0.01 0.00 - 0.20 10*3/mm3    Immature Grans, Absolute 0.01 0.00 - 0.05 10*3/mm3   PSA Screen    Specimen: Blood   Result Value Ref Range    PSA 8.740 (H) 0.000 - 4.000 ng/mL   Microalbumin / Creatinine Urine Ratio - Urine, Clean Catch    Specimen: Urine, Clean Catch   Result Value Ref Range    Microalbumin/Creatinine Ratio 14.6 0.0 - 29.0 mg/g    Creatinine, Urine 109.4 mg/dL    Microalbumin, Urine 1.6 mg/dL      PSA          4/26/2024    10:04   PSA   PSA 8.740          Procedures        Assessment and Plan    Diagnoses and all orders for this visit:    1. Elevated prostate specific antigen (PSA) (Primary)  -     PSA Diagnostic; Future      Discussed with the patient various pathologies for elevated PSA including possible infection for which he is currently being treated.    Elevated PSA-lab results discussed at length with patient.  Discussed several options with patient including a standard prostate biopsy; obtaining an MRI of prostate with possible subsequent fusion biopsy as results distate; or to persue no further work up of elevated psa.    Patient aware via the autopsy study that his risk of having prostate cancer is approximate to his age; 76%.  Patient also aware that the majority of prostate cancers detected are low or very low risk that are amenable to active surveillance protocols.  Patient notes understanding that whether or not further workup for prostate cancer is pursued that  a diagnosis of clinically significant prostate cancer remains uncertain.  Discussed AUA guidelines for prostate cancer detection noting that PSA screening is not recommended after the age of 70 given life expectancy and the indolent nature of most prostate cancers.  Patient participated in the discussion and asked appropriate questions.   all questions answered to the best of my ability and his apparent satisfaction.      We will proceed with completion of antibiotics for prostatitis and f/u with PSA in 7  weeks.      I spent  15 minutes caring for Judson on this date of service. This time includes time spent by me in the following activities:reviewing tests, obtaining and/or reviewing a separately obtained history, performing a medically appropriate examination and/or evaluation , counseling and educating the patient/family/caregiver, ordering medications, tests, or procedures, and documenting information in the medical record  Follow Up   Return in about 7 weeks (around 7/3/2024) for f/u elevated psa .  Patient was given instructions and counseling regarding his condition or for health maintenance advice. Please see specific information pulled into the AVS if appropriate.         This document has been electronically signed by CARIDAD Wilkes  May 15, 2024 13:44 EDT

## 2024-06-06 ENCOUNTER — OFFICE VISIT (OUTPATIENT)
Dept: FAMILY MEDICINE CLINIC | Age: 79
End: 2024-06-06
Payer: MEDICARE

## 2024-06-06 VITALS
WEIGHT: 191.2 LBS | BODY MASS INDEX: 23.77 KG/M2 | HEART RATE: 70 BPM | HEIGHT: 75 IN | SYSTOLIC BLOOD PRESSURE: 130 MMHG | DIASTOLIC BLOOD PRESSURE: 65 MMHG | TEMPERATURE: 97.2 F

## 2024-06-06 DIAGNOSIS — H65.93 MIDDLE EAR EFFUSION, BILATERAL: ICD-10-CM

## 2024-06-06 DIAGNOSIS — R42 DIZZINESS: ICD-10-CM

## 2024-06-06 DIAGNOSIS — R11.0 NAUSEA: ICD-10-CM

## 2024-06-06 DIAGNOSIS — S00.411A ABRASION OF RIGHT EAR CANAL, INITIAL ENCOUNTER: Primary | ICD-10-CM

## 2024-06-06 DIAGNOSIS — H61.21 IMPACTED CERUMEN OF RIGHT EAR: ICD-10-CM

## 2024-06-06 PROCEDURE — 1160F RVW MEDS BY RX/DR IN RCRD: CPT | Performed by: NURSE PRACTITIONER

## 2024-06-06 PROCEDURE — 1159F MED LIST DOCD IN RCRD: CPT | Performed by: NURSE PRACTITIONER

## 2024-06-06 PROCEDURE — 3075F SYST BP GE 130 - 139MM HG: CPT | Performed by: NURSE PRACTITIONER

## 2024-06-06 PROCEDURE — 69209 REMOVE IMPACTED EAR WAX UNI: CPT | Performed by: NURSE PRACTITIONER

## 2024-06-06 PROCEDURE — 99214 OFFICE O/P EST MOD 30 MIN: CPT | Performed by: NURSE PRACTITIONER

## 2024-06-06 PROCEDURE — 3078F DIAST BP <80 MM HG: CPT | Performed by: NURSE PRACTITIONER

## 2024-06-06 PROCEDURE — 1126F AMNT PAIN NOTED NONE PRSNT: CPT | Performed by: NURSE PRACTITIONER

## 2024-06-06 RX ORDER — MECLIZINE HYDROCHLORIDE 25 MG/1
25 TABLET ORAL 3 TIMES DAILY PRN
Qty: 30 TABLET | Refills: 1 | Status: SHIPPED | OUTPATIENT
Start: 2024-06-06

## 2024-06-06 RX ORDER — FLUTICASONE PROPIONATE 50 MCG
2 SPRAY, SUSPENSION (ML) NASAL DAILY
Qty: 9.9 ML | Refills: 1 | Status: SHIPPED | OUTPATIENT
Start: 2024-06-06

## 2024-06-06 RX ORDER — ONDANSETRON 4 MG/1
4 TABLET, ORALLY DISINTEGRATING ORAL EVERY 8 HOURS PRN
Qty: 30 TABLET | Refills: 1 | Status: SHIPPED | OUTPATIENT
Start: 2024-06-06

## 2024-06-06 NOTE — PROGRESS NOTES
Ear Cerumen Removal    Date/Time: 6/6/2024 3:00 PM    Performed by: Kat Benson LPN  Authorized by: Mireille Hay APRN  Location details: right ear  Patient tolerance: patient tolerated the procedure well with no immediate complications  Comments: Hard ball of wax removed from R ear, eardrum visible  Procedure type: irrigation   Sedation:  Patient sedated: no

## 2024-06-06 NOTE — PROGRESS NOTES
"Chief Complaint  Earache (R ear)    Subjective    Patient is in today with complaints of drainage from his right ear.  He reports waking up this morning, and when he was wiping his head, he felt some drainage.  He used a Q-tip, and noticed some blood on the Q-tip.  He denies any ear pain, but but reports that he did have a dizzy spell last week when he was working in his garden, and bent over.  He reports that he had to go sit down for about 30 minutes, and symptoms resolved on their own.  He reports ports speaking to a friend who reports he has vertigo, and it sounded very similar.  Mr. Ochoa has been dealing with hearing issues, and has recently developed tinnitus.  [facility]        Judson Ochoa presents to [facility]  Earache   There is pain in the right ear. This is a new problem. The current episode started today. There has been no fever. Associated symptoms include drainage. Pertinent negatives include no rhinorrhea. He has tried analgesic ear drops for the symptoms. The treatment provided no relief. His past medical history is significant for hearing loss.       Objective   Vital Signs:  /65 (BP Location: Right arm, Patient Position: Sitting)   Pulse 70   Temp 97.2 °F (36.2 °C) (Temporal)   Ht 190.5 cm (75\")   Wt 86.7 kg (191 lb 3.2 oz)   BMI 23.90 kg/m²   Estimated body mass index is 23.9 kg/m² as calculated from the following:    Height as of this encounter: 190.5 cm (75\").    Weight as of this encounter: 86.7 kg (191 lb 3.2 oz).       BMI is within normal parameters. No other follow-up for BMI required.      Physical Exam  HENT:      Head: Normocephalic.      Right Ear: Decreased hearing noted. A middle ear effusion is present. Tympanic membrane is not perforated.      Left Ear: A middle ear effusion is present.      Ears:      Comments: Bleeding noted to ear canal, TM intact  Cardiovascular:      " Rate and Rhythm: Normal rate.   Pulmonary:      Effort: Pulmonary effort is normal.   Skin:     General: Skin is warm and dry.   Neurological:      Mental Status: He is alert and oriented to person, place, and time.   Psychiatric:         Mood and Affect: Mood normal.        Result Review :                     Assessment and Plan     Diagnoses and all orders for this visit:    1. Abrasion of right ear canal, initial encounter (Primary)  Comments:  TM intact, ear drops for abrasion and irritation, F/U if drainage continues  Orders:  -     neomycin-polymyxin-hydrocortisone (CORTISPORIN) 3.5-94950-7 otic solution; Administer 3 drops to the right ear 3 (Three) Times a Day.  Dispense: 10 mL; Refill: 0    2. Middle ear effusion, bilateral  -     fluticasone (FLONASE) 50 MCG/ACT nasal spray; 2 sprays into the nostril(s) as directed by provider Daily.  Dispense: 9.9 mL; Refill: 1    3. Dizziness  -     meclizine (ANTIVERT) 25 MG tablet; Take 1 tablet by mouth 3 (Three) Times a Day As Needed for Dizziness.  Dispense: 30 tablet; Refill: 1    4. Nausea  -     ondansetron ODT (ZOFRAN-ODT) 4 MG disintegrating tablet; Place 1 tablet on the tongue Every 8 (Eight) Hours As Needed for Nausea.  Dispense: 30 tablet; Refill: 1    5. Impacted cerumen of right ear  -     Ear Cerumen Removal    Continue with plans to see ENT/audiology. F/U if symptoms persist, ED for worsening dizziness/vertigo CP, SOA.          Follow Up     Return if symptoms worsen or fail to improve.  Patient was given instructions and counseling regarding his condition or for health maintenance advice. Please see specific information pulled into the AVS if appropriate.

## 2024-06-18 ENCOUNTER — TELEPHONE (OUTPATIENT)
Dept: FAMILY MEDICINE CLINIC | Age: 79
End: 2024-06-18
Payer: MEDICARE

## 2024-06-18 NOTE — TELEPHONE ENCOUNTER
Caller: KY HARRISON    Relationship: Emergency Contact    Best call back number: [phone]    What is the best time to reach you: ANYTIME    Who are you requesting to speak with (clinical staff, provider,  specific staff member): WILFRED    What was the call regarding: PATIENT WOULD LIKE FOR WILFRED TO GIVE HER A CALL REGARDING A REFERRAL FOR PATIENT AND HIS HEARING

## 2024-06-19 DIAGNOSIS — H93.19 TINNITUS, UNSPECIFIED LATERALITY: Primary | ICD-10-CM

## 2024-06-19 DIAGNOSIS — H91.90 HEARING LOSS, UNSPECIFIED HEARING LOSS TYPE, UNSPECIFIED LATERALITY: ICD-10-CM

## 2024-06-19 NOTE — TELEPHONE ENCOUNTER
Pt is having a rushing noise behind his ears.  He is also interested in checking into hearing aides. He would like a referral to someone who can take care of both of those issues.

## 2024-06-26 ENCOUNTER — LAB (OUTPATIENT)
Dept: LAB | Facility: HOSPITAL | Age: 79
End: 2024-06-26
Payer: MEDICARE

## 2024-06-26 DIAGNOSIS — H93.13 TINNITUS OF BOTH EARS: Primary | ICD-10-CM

## 2024-06-26 DIAGNOSIS — H91.90 HEARING LOSS, UNSPECIFIED HEARING LOSS TYPE, UNSPECIFIED LATERALITY: ICD-10-CM

## 2024-06-26 DIAGNOSIS — R97.20 ELEVATED PROSTATE SPECIFIC ANTIGEN (PSA): ICD-10-CM

## 2024-06-26 DIAGNOSIS — R97.20 PSA ELEVATION: ICD-10-CM

## 2024-06-26 LAB — PSA SERPL-MCNC: 8.26 NG/ML (ref 0–4)

## 2024-06-26 PROCEDURE — 36415 COLL VENOUS BLD VENIPUNCTURE: CPT

## 2024-06-26 PROCEDURE — 84153 ASSAY OF PSA TOTAL: CPT

## 2024-06-27 DIAGNOSIS — R97.20 PSA ELEVATION: Primary | ICD-10-CM

## 2024-07-11 ENCOUNTER — OFFICE VISIT (OUTPATIENT)
Dept: OTOLARYNGOLOGY | Facility: CLINIC | Age: 79
End: 2024-07-11
Payer: MEDICARE

## 2024-07-11 DIAGNOSIS — H93.19 TINNITUS, UNSPECIFIED LATERALITY: ICD-10-CM

## 2024-07-11 DIAGNOSIS — H90.A21 SENSORINEURAL HEARING LOSS (SNHL) OF RIGHT EAR WITH RESTRICTED HEARING OF LEFT EAR: ICD-10-CM

## 2024-07-11 DIAGNOSIS — H93.13 TINNITUS OF BOTH EARS: Primary | ICD-10-CM

## 2024-07-11 DIAGNOSIS — H90.A22 SENSORINEURAL HEARING LOSS (SNHL) OF LEFT EAR WITH RESTRICTED HEARING OF RIGHT EAR: ICD-10-CM

## 2024-07-11 NOTE — PROGRESS NOTES
AUDIOMETRIC EVALUATION      Name:  Judson Ochoa  :  1945  Age:  79 y.o.  Date of Evaluation:  2024       History:  Mr. Ochoa is seen today for a hearing evaluation due to hearing loss and pulsatile tinnitus.    Audiologic Information:  Concerns for Hearing: Yes  PETs: No  Other otologic surgical history: None  Aural Pressure/Fullness: No  Otalgia: None  Otorrhea: None noted  Tinnitus: Yes each ear  Dizziness: No  Noise Exposure: Yes   Family history of hearing loss: No  Head trauma requiring hospital stay: No  Chemotherapy: None  Other significant history: No    EVALUATION:    See audiogram    RESULTS:    Otoscopic Evaluation:  Right: Mild abrasion inferiorly  Left: Unremarkable    Tympanometry (226 Hz):  Right: Type A  Left: Type A    IMPRESSIONS:  Pure tone thresholds for the right ear indicates a mild sensorineural hearing loss at point to 5K to 1K hertz.  A severe sensorineural hearing loss at 2K to 8K hertz.  Pure tone thresholds for the left ear indicates a mild sensorineural hearing loss at 0.25 K to 0.5 K hertz.  A moderate sensorineural hearing loss at 1K hertz.  A severe sensorineural hearing loss 2K to 8K hertz.  Patient was counseled with regard to the findings.    Amplification needs:  Patient could benefit from amplification if they feel increased communication difficulties.    RECOMMENDATIONS/PLAN:  Follow-up as needed  Hearing aid evaluation through the [facility]  Discussed results and recommendations with patient. Questions were addressed and the patient was encouraged to contact our department should concerns arise.          Dustin Carias M.S, [facility]  Licensed Audiologist

## 2024-07-29 ENCOUNTER — HOSPITAL ENCOUNTER (OUTPATIENT)
Facility: HOSPITAL | Age: 79
Discharge: HOME | End: 2024-07-29
Admitting: NURSE PRACTITIONER
Payer: MEDICARE

## 2024-07-29 DIAGNOSIS — R97.20 PSA ELEVATION: ICD-10-CM

## 2024-07-29 PROCEDURE — A9577 INJ MULTIHANCE: HCPCS | Performed by: NURSE PRACTITIONER

## 2024-07-29 PROCEDURE — 0 GADOBENATE DIMEGLUMINE 529 MG/ML SOLUTION: Performed by: NURSE PRACTITIONER

## 2024-07-29 PROCEDURE — 72197 MRI PELVIS W/O & W/DYE: CPT

## 2024-07-29 RX ADMIN — GADOBENATE DIMEGLUMINE 20 ML: 529 INJECTION, SOLUTION INTRAVENOUS at 17:16

## 2024-08-07 DIAGNOSIS — R97.20 ELEVATED PROSTATE SPECIFIC ANTIGEN (PSA): Primary | ICD-10-CM

## 2024-10-14 ENCOUNTER — LAB (OUTPATIENT)
Dept: LAB | Facility: HOSPITAL | Age: 79
End: 2024-10-14
Payer: MEDICARE

## 2024-10-14 ENCOUNTER — OFFICE VISIT (OUTPATIENT)
Dept: FAMILY MEDICINE CLINIC | Age: 79
End: 2024-10-14
Payer: MEDICARE

## 2024-10-14 VITALS
SYSTOLIC BLOOD PRESSURE: 141 MMHG | HEIGHT: 75 IN | TEMPERATURE: 97.3 F | HEART RATE: 61 BPM | WEIGHT: 198 LBS | OXYGEN SATURATION: 98 % | BODY MASS INDEX: 24.62 KG/M2 | DIASTOLIC BLOOD PRESSURE: 73 MMHG

## 2024-10-14 DIAGNOSIS — I10 PRIMARY HYPERTENSION: ICD-10-CM

## 2024-10-14 DIAGNOSIS — E78.2 MIXED HYPERLIPIDEMIA: ICD-10-CM

## 2024-10-14 DIAGNOSIS — E11.9 TYPE 2 DIABETES MELLITUS WITHOUT COMPLICATION, WITHOUT LONG-TERM CURRENT USE OF INSULIN: Primary | ICD-10-CM

## 2024-10-14 DIAGNOSIS — Z28.21 VACCINATION HESITANCY BY PATIENT: ICD-10-CM

## 2024-10-14 DIAGNOSIS — R97.20 ELEVATED PROSTATE SPECIFIC ANTIGEN (PSA): ICD-10-CM

## 2024-10-14 LAB
EXPIRATION DATE: ABNORMAL
HBA1C MFR BLD: 6.9 % (ref 4.5–5.7)
Lab: ABNORMAL

## 2024-10-14 PROCEDURE — 99214 OFFICE O/P EST MOD 30 MIN: CPT | Performed by: FAMILY MEDICINE

## 2024-10-14 PROCEDURE — 1126F AMNT PAIN NOTED NONE PRSNT: CPT | Performed by: FAMILY MEDICINE

## 2024-10-14 PROCEDURE — 36415 COLL VENOUS BLD VENIPUNCTURE: CPT

## 2024-10-14 PROCEDURE — 3078F DIAST BP <80 MM HG: CPT | Performed by: FAMILY MEDICINE

## 2024-10-14 PROCEDURE — 3077F SYST BP >= 140 MM HG: CPT | Performed by: FAMILY MEDICINE

## 2024-10-14 PROCEDURE — 84153 ASSAY OF PSA TOTAL: CPT

## 2024-10-14 PROCEDURE — 3044F HG A1C LEVEL LT 7.0%: CPT | Performed by: FAMILY MEDICINE

## 2024-10-14 PROCEDURE — 83036 HEMOGLOBIN GLYCOSYLATED A1C: CPT | Performed by: FAMILY MEDICINE

## 2024-10-14 NOTE — PROGRESS NOTES
Chief Complaint  Diabetes (6 month follow up ), Hyperlipidemia, and Hypertension    Subjective          Judson Ochoa presents to [facility]  History of Present Illness      At his last office visit in May reviewed hemoglobin A1c at 8.3 demonstrating poorly controlled blood sugars.  At that point he stated he wanted to bring sugars under control by diet and lifestyle changes.  Did let him know I would hold him accountable.  Been taking his blood sugars 3 days a week in general.  Says sugars have been running in the 1/21/1930 range but trip to [place] and I have after midnight run to the [employer] he had some elevated blood sugars.    We had also sent him to urology for elevated PSA.  He did complete prostate MRI in July.  Has follow-up visit with urology later this month.        Current Outpatient Medications on File Prior to Visit   Medication Sig Dispense Refill    amLODIPine (NORVASC) 5 MG tablet TAKE 1 TABLET BY MOUTH DAILY 90 tablet 1    fenofibrate (TRICOR) 145 MG tablet TAKE 1 TABLET BY MOUTH DAILY 90 tablet 1    fluticasone (FLONASE) 50 MCG/ACT nasal spray 2 sprays into the nostril(s) as directed by provider Daily. 9.9 mL 1    lisinopril (PRINIVIL,ZESTRIL) 10 MG tablet TAKE 1 TABLET BY MOUTH DAILY 90 tablet 1    meclizine (ANTIVERT) 25 MG tablet Take 1 tablet by mouth 3 (Three) Times a Day As Needed for Dizziness. 30 tablet 1    metFORMIN (GLUCOPHAGE) 500 MG tablet TAKE 1 TABLET BY MOUTH THREE TIMES DAILY 270 tablet 1    ondansetron ODT (ZOFRAN-ODT) 4 MG disintegrating tablet Place 1 tablet on the tongue Every 8 (Eight) Hours As Needed for Nausea. 30 tablet 1    sildenafil (REVATIO) 20 MG tablet Take 1 tablet by mouth As Needed (one hour before sex). 20 tablet 0    neomycin-polymyxin-hydrocortisone (CORTISPORIN) 3.5-93636-0 otic solution Administer 3 drops to the right ear 3 (Three) Times a Day. (Patient not taking: Reported on 10/14/2024) 10 mL 0     No current  "facility-administered medications on file prior to visit.       Review of Systems         Objective   Vital Signs:   /73 (BP Location: Left arm, Patient Position: Sitting, Cuff Size: Adult)   Pulse 61   Temp 97.3 °F (36.3 °C) (Temporal)   Ht 190.5 cm (75\")   Wt 89.8 kg (198 lb)   SpO2 98%   BMI 24.75 kg/m²     Physical Exam  Constitutional:       Appearance: Normal appearance.   Neck:      Vascular: No carotid bruit.   Cardiovascular:      Rate and Rhythm: Normal rate and regular rhythm.      Heart sounds: Normal heart sounds. No murmur heard.  Pulmonary:      Effort: No respiratory distress.      Breath sounds: No wheezing or rales.   Musculoskeletal:      Right lower leg: No edema.      Left lower leg: No edema.   Lymphadenopathy:      Cervical: No cervical adenopathy.   Neurological:      General: No focal deficit present.      Mental Status: He is alert.   Psychiatric:         Attention and Perception: Attention normal.         Mood and Affect: Mood normal.         Speech: Speech normal.            Result Review :                     Assessment and Plan    Diagnoses and all orders for this visit:    1. Type 2 diabetes mellitus without complication, without long-term current use of insulin (Primary)  Assessment & Plan:  Hemoglobin A1c 6.9 today.  Commended him for good job of bringing his lifestyle into line to get blood sugars under better control.  Keep up the good work and stay on current medications for now    Orders:  -     POC Glycosylated Hemoglobin (Hb A1C)    2. Primary hypertension  Assessment & Plan:  Blood pressure is borderline today.  He says that blood pressure generally has been good at other doctor visits.  Will continue on current regimen for now.        3. Mixed hyperlipidemia  Assessment & Plan:  States that [facility] and [facility].  He will give us a copy of their lab results.        4. Vaccination hesitancy by patient  Assessment & " Plan:  He declines respiratory infection vaccines today.          Follow Up   No follow-ups on file.  Patient was given instructions and counseling regarding his condition or for health maintenance advice. Please see specific information pulled into the AVS if appropriate.

## 2024-10-14 NOTE — ASSESSMENT & PLAN NOTE
Blood pressure is borderline today.  He says that blood pressure generally has been good at other doctor visits.  Will continue on current regimen for now.

## 2024-10-14 NOTE — ASSESSMENT & PLAN NOTE
Hemoglobin A1c 6.9 today.  Commended him for good job of bringing his lifestyle into line to get blood sugars under better control.  Keep up the good work and stay on current medications for now

## 2024-10-14 NOTE — ASSESSMENT & PLAN NOTE
States that [facility] is going [facility] and [facility] will draw blood.  He will give us a copy of their lab results.

## 2024-10-15 LAB — PSA SERPL-MCNC: 5.82 NG/ML (ref 0–4)

## 2024-10-18 DIAGNOSIS — I10 HYPERTENSION, ESSENTIAL: ICD-10-CM

## 2024-10-18 RX ORDER — LISINOPRIL 10 MG/1
10 TABLET ORAL DAILY
Qty: 90 TABLET | Refills: 1 | Status: SHIPPED | OUTPATIENT
Start: 2024-10-18

## 2024-10-23 ENCOUNTER — OFFICE VISIT (OUTPATIENT)
Dept: UROLOGY | Facility: CLINIC | Age: 79
End: 2024-10-23
Payer: MEDICARE

## 2024-10-23 VITALS
DIASTOLIC BLOOD PRESSURE: 64 MMHG | OXYGEN SATURATION: 100 % | HEIGHT: 75 IN | SYSTOLIC BLOOD PRESSURE: 138 MMHG | WEIGHT: 198 LBS | HEART RATE: 58 BPM | BODY MASS INDEX: 24.62 KG/M2

## 2024-10-23 DIAGNOSIS — R97.20 ELEVATED PROSTATE SPECIFIC ANTIGEN (PSA): Primary | ICD-10-CM

## 2024-10-23 NOTE — PROGRESS NOTES
Chief Complaint: Follow-up and Elevated PSA    Subjective         Follow-up    Judson Ochoa is a 79 y.o. male presents to [facility] to be seen for evaded PSA.    Underwent MRI of the prostate on 7/29/2024.    MRI revealed 76 cc prostate with fully encapsulated partially extruded BPH nodule in the left peripheral zone documented PI-RADS 2 low clinically significant cancer unlikely.    He presents today with a repeat PSA from 10/14/2024 which is now down to 5.82    No bothersome urinary symptoms.     He states decreased stream on awakening.        Previous:  Patient saw his primary care provider on 5/13/2024 for follow-up and had labs prior to being seen.    The patient apparently had an abnormal PSA on screening starting approximately 3 years ago.    His PSA has been over 4 for some time.    PSA was performed 4/24/2023 and was noted to be 5.19 PSA was repeated on 4/26/2024 and was noted to be 8.74.    It appears that the patient has been seen by urology previously at [facility] for BPH and renal cyst.  Last seen in March 2021.    He states urinary stream is slower in the morning better during the day.     Nocturia x 1    Denies perineal pain.     No burning with urination.     He does have back pain that is chronic worse in the last several weeks after driving t posts into the ground.    He was told that his elevated psa may be related to an infection was given bactrim for 14 days starting yesterday.    No family hx of gu malignancies that he knows of but he has a cousin an younger brother that have had prostate removed.      He does have longevity in his family father lived to be 100 great aunt 110.    He is on no anticoagulants.      Objective     Past Medical History:   Diagnosis Date    Abdominal aortic aneurysm without rupture     Anemia     Arthritis     Decreased white blood cell count     Elevated prostate specific antigen (PSA)     Erectile dysfunction      Hematospermia     HL (hearing loss)     Hypertension     Low back pain     Male erectile disorder     Male erectile dysfunction     Mixed hyperlipidemia     Neoplasm of uncertain behavior of left kidney     Nicotine dependence     Obstructive sleep apnea (adult) (pediatric)     Palmar fascial fibromatosis     Peripheral vascular disease     Type 2 diabetes mellitus without complication        Past Surgical History:   Procedure Laterality Date    ABDOMINAL AORTIC ANEURYSM REPAIR  2018    endovascular stenting    CIRCUMCISION  2005    COLONOSCOPY  10/2007    normal    CYSTOSCOPY           Current Outpatient Medications:     amLODIPine (NORVASC) 5 MG tablet, TAKE 1 TABLET BY MOUTH DAILY, Disp: 90 tablet, Rfl: 1    fenofibrate (TRICOR) 145 MG tablet, TAKE 1 TABLET BY MOUTH DAILY, Disp: 90 tablet, Rfl: 1    fluticasone (FLONASE) 50 MCG/ACT nasal spray, 2 sprays into the nostril(s) as directed by provider Daily., Disp: 9.9 mL, Rfl: 1    lisinopril (PRINIVIL,ZESTRIL) 10 MG tablet, TAKE 1 TABLET BY MOUTH DAILY, Disp: 90 tablet, Rfl: 1    meclizine (ANTIVERT) 25 MG tablet, Take 1 tablet by mouth 3 (Three) Times a Day As Needed for Dizziness., Disp: 30 tablet, Rfl: 1    metFORMIN (GLUCOPHAGE) 500 MG tablet, TAKE 1 TABLET BY MOUTH THREE TIMES DAILY, Disp: 270 tablet, Rfl: 1    ondansetron ODT (ZOFRAN-ODT) 4 MG disintegrating tablet, Place 1 tablet on the tongue Every 8 (Eight) Hours As Needed for Nausea., Disp: 30 tablet, Rfl: 1    sildenafil (REVATIO) 20 MG tablet, Take 1 tablet by mouth As Needed (one hour before sex)., Disp: 20 tablet, Rfl: 0    neomycin-polymyxin-hydrocortisone (CORTISPORIN) 3.5-23091-3 otic solution, Administer 3 drops to the right ear 3 (Three) Times a Day. (Patient not taking: Reported on 10/23/2024), Disp: 10 mL, Rfl: 0    No Known Allergies     Family History   Problem Relation Age of Onset    Heart failure Mother         cause of death    Coronary artery disease Mother     Diabetes type II Mother      "Diabetes Mother     Hearing loss Mother     Diabetes type II Father     Stroke Brother     Diabetes type I Brother        Social History     Socioeconomic History    Marital status:     Number of children: 1   Tobacco Use    Smoking status: Every Day     Current packs/day: 1.00     Average packs/day: 1 pack/day for 60.0 years (60.0 ttl pk-yrs)     Types: Cigarettes     Passive exposure: Current    Smokeless tobacco: Never   Vaping Use    Vaping status: Never Used   Substance and Sexual Activity    Alcohol use: Yes     Alcohol/week: 3.0 standard drinks of alcohol     Types: 3 Cans of beer per week     Comment: 1 can of Beer after garden or yard work    Drug use: Never    Sexual activity: Yes     Partners: Female       Vital Signs:   /64 (BP Location: Right arm, Patient Position: Sitting, Cuff Size: Adult)   Pulse 58   Ht 190.5 cm (75\")   Wt 89.8 kg (198 lb)   SpO2 100%   BMI 24.75 kg/m²      Physical Exam     Result Review :   The following data was reviewed by: CARIDAD Wilkes on 05/15/2024:  Results for orders placed or performed in visit on 10/14/24   PSA DIAGNOSTIC    Collection Time: 10/14/24 10:16 AM    Specimen: Blood   Result Value Ref Range    PSA 5.820 (H) 0.000 - 4.000 ng/mL      PSA          4/26/2024    10:04 6/26/2024    10:16 10/14/2024    10:16   PSA   PSA 8.740  8.260  5.820      EXAM: MRI OF THE PROSTATE WITH AND WITHOUT CONTRAST.     TECHNIQUE: Multiplanar, multi-sequential MRI of the prostate performed  before and after the administration of IV MultiHance.     CLINICAL HISTORY: Elevated PSA, most recently 8.26 ng/mL.     Findings: Study is partially limited by motion.     Prostate size and volume: 4.3 x 5.7 x 5.9 cm and 76 cc.     PSA density: 0.11 ng/mL/cc        Prostate: BPH. Fully encapsulated, partially extruded BPH nodule in the  left peripheral zone at mid gland (series 4/image 25) (PI-RADS 2).     Capsular margin and neurovascular bundle: Unremarkable.     Seminal " vesicles: Unremarkable.     Lymph nodes: No pathologically enlarged pelvic or inguinal lymph nodes  by size criteria.     Bones: Unremarkable.     Other: Unremarkable bladder. Partially visualized aortobiiliac stent  graft           IMPRESSION:  PI-RADS 2 = low-clinically significant cancer unlikely.           This report was finalized on 7/30/2024 2:39 PM by Dr. Jose A Gonsalez M.D on Workstation: [id number]    Procedures        Assessment and Plan    Diagnoses and all orders for this visit:    1. Elevated prostate specific antigen (PSA) (Primary)      Psa has gone back down to within his normal range.     PSA density is low at this time.     Will plan for f/u in 6 months with a repeat PSA.    He states no bothersome urinary symptoms at this time.    I spent 10 minutes caring for Judson on this date of service. This time includes time spent by me in the following activities:reviewing tests, obtaining and/or reviewing a separately obtained history, performing a medically appropriate examination and/or evaluation , counseling and educating the patient/family/caregiver, ordering medications, tests, or procedures, and documenting information in the medical record  Follow Up   Return in about 6 months (around 4/23/2025) for f/u elevated psa.  Patient was given instructions and counseling regarding his condition or for health maintenance advice. Please see specific information pulled into the AVS if appropriate.         This document has been electronically signed by CARIDAD Wilkes  October 23, 2024 11:25 EDT

## 2024-10-24 DIAGNOSIS — E78.2 MIXED HYPERLIPIDEMIA: ICD-10-CM

## 2024-10-24 DIAGNOSIS — N52.01 ERECTILE DYSFUNCTION DUE TO ARTERIAL INSUFFICIENCY: ICD-10-CM

## 2024-10-24 DIAGNOSIS — E11.9 TYPE 2 DIABETES MELLITUS WITHOUT COMPLICATION, WITHOUT LONG-TERM CURRENT USE OF INSULIN: ICD-10-CM

## 2024-10-26 RX ORDER — FENOFIBRATE 145 MG/1
145 TABLET, COATED ORAL DAILY
Qty: 90 TABLET | Refills: 1 | Status: SHIPPED | OUTPATIENT
Start: 2024-10-26

## 2024-10-26 RX ORDER — SILDENAFIL CITRATE 20 MG/1
TABLET ORAL
Qty: 20 TABLET | Refills: 0 | Status: SHIPPED | OUTPATIENT
Start: 2024-10-26

## 2024-10-28 DIAGNOSIS — I10 HYPERTENSION, ESSENTIAL: ICD-10-CM

## 2024-10-28 RX ORDER — AMLODIPINE BESYLATE 5 MG/1
5 TABLET ORAL DAILY
Qty: 90 TABLET | Refills: 1 | Status: SHIPPED | OUTPATIENT
Start: 2024-10-28

## 2025-01-15 DIAGNOSIS — E78.2 MIXED HYPERLIPIDEMIA: ICD-10-CM

## 2025-01-16 RX ORDER — FENOFIBRATE 145 MG/1
145 TABLET, COATED ORAL DAILY
Qty: 90 TABLET | Refills: 1 | OUTPATIENT
Start: 2025-01-16

## 2025-04-20 DIAGNOSIS — I10 HYPERTENSION, ESSENTIAL: ICD-10-CM

## 2025-04-21 RX ORDER — AMLODIPINE BESYLATE 5 MG/1
5 TABLET ORAL DAILY
Qty: 90 TABLET | Refills: 1 | Status: SHIPPED | OUTPATIENT
Start: 2025-04-21

## 2025-04-28 ENCOUNTER — LAB (OUTPATIENT)
Dept: LAB | Facility: HOSPITAL | Age: 80
End: 2025-04-28
Payer: MEDICARE

## 2025-04-28 ENCOUNTER — OFFICE VISIT (OUTPATIENT)
Dept: FAMILY MEDICINE CLINIC | Age: 80
End: 2025-04-28
Payer: MEDICARE

## 2025-04-28 VITALS
WEIGHT: 201.8 LBS | HEART RATE: 65 BPM | BODY MASS INDEX: 25.09 KG/M2 | HEIGHT: 75 IN | SYSTOLIC BLOOD PRESSURE: 122 MMHG | DIASTOLIC BLOOD PRESSURE: 58 MMHG | TEMPERATURE: 97.5 F | OXYGEN SATURATION: 98 %

## 2025-04-28 DIAGNOSIS — E11.9 TYPE 2 DIABETES MELLITUS WITHOUT COMPLICATION, WITHOUT LONG-TERM CURRENT USE OF INSULIN: ICD-10-CM

## 2025-04-28 DIAGNOSIS — Z00.00 MEDICARE ANNUAL WELLNESS VISIT, SUBSEQUENT: Primary | ICD-10-CM

## 2025-04-28 DIAGNOSIS — E78.2 MIXED HYPERLIPIDEMIA: ICD-10-CM

## 2025-04-28 DIAGNOSIS — I71.40 ABDOMINAL AORTIC ANEURYSM (AAA) 3.0 CM TO 5.5 CM IN DIAMETER IN MALE: ICD-10-CM

## 2025-04-28 DIAGNOSIS — I10 PRIMARY HYPERTENSION: ICD-10-CM

## 2025-04-28 DIAGNOSIS — I10 HYPERTENSION, ESSENTIAL: ICD-10-CM

## 2025-04-28 DIAGNOSIS — F17.210 CIGARETTE SMOKER: ICD-10-CM

## 2025-04-28 DIAGNOSIS — I73.9 PERIPHERAL VASCULAR DISEASE, UNSPECIFIED: ICD-10-CM

## 2025-04-28 DIAGNOSIS — H91.93 HEARING DEFICIT, BILATERAL: ICD-10-CM

## 2025-04-28 DIAGNOSIS — G47.33 OBSTRUCTIVE SLEEP APNEA (ADULT) (PEDIATRIC): ICD-10-CM

## 2025-04-28 DIAGNOSIS — R97.20 ELEVATED PROSTATE SPECIFIC ANTIGEN (PSA): ICD-10-CM

## 2025-04-28 LAB
ALBUMIN SERPL-MCNC: 4.4 G/DL (ref 3.5–5.2)
ALBUMIN/GLOB SERPL: 1.8 G/DL
ALP SERPL-CCNC: 52 U/L (ref 39–117)
ALT SERPL W P-5'-P-CCNC: 17 U/L (ref 1–41)
ANION GAP SERPL CALCULATED.3IONS-SCNC: 11.1 MMOL/L (ref 5–15)
AST SERPL-CCNC: 17 U/L (ref 1–40)
BASOPHILS # BLD AUTO: 0.02 10*3/MM3 (ref 0–0.2)
BASOPHILS NFR BLD AUTO: 0.6 % (ref 0–1.5)
BILIRUB SERPL-MCNC: 0.3 MG/DL (ref 0–1.2)
BUN SERPL-MCNC: 17 MG/DL (ref 8–23)
BUN/CREAT SERPL: 14 (ref 7–25)
CALCIUM SPEC-SCNC: 9 MG/DL (ref 8.6–10.5)
CHLORIDE SERPL-SCNC: 104 MMOL/L (ref 98–107)
CHOLEST SERPL-MCNC: 161 MG/DL (ref 0–200)
CO2 SERPL-SCNC: 23.9 MMOL/L (ref 22–29)
CREAT SERPL-MCNC: 1.21 MG/DL (ref 0.76–1.27)
DEPRECATED RDW RBC AUTO: 43.8 FL (ref 37–54)
EGFRCR SERPLBLD CKD-EPI 2021: 61.7 ML/MIN/1.73
EOSINOPHIL # BLD AUTO: 0.23 10*3/MM3 (ref 0–0.4)
EOSINOPHIL NFR BLD AUTO: 6.7 % (ref 0.3–6.2)
ERYTHROCYTE [DISTWIDTH] IN BLOOD BY AUTOMATED COUNT: 13.7 % (ref 12.3–15.4)
GLOBULIN UR ELPH-MCNC: 2.5 GM/DL
GLUCOSE SERPL-MCNC: 216 MG/DL (ref 65–99)
HBA1C MFR BLD: 8.1 % (ref 4.8–5.6)
HCT VFR BLD AUTO: 38.2 % (ref 37.5–51)
HDLC SERPL-MCNC: 41 MG/DL (ref 40–60)
HGB BLD-MCNC: 12.8 G/DL (ref 13–17.7)
IMM GRANULOCYTES # BLD AUTO: 0.02 10*3/MM3 (ref 0–0.05)
IMM GRANULOCYTES NFR BLD AUTO: 0.6 % (ref 0–0.5)
LDLC SERPL CALC-MCNC: 85 MG/DL (ref 0–100)
LDLC/HDLC SERPL: 1.91 {RATIO}
LYMPHOCYTES # BLD AUTO: 1.48 10*3/MM3 (ref 0.7–3.1)
LYMPHOCYTES NFR BLD AUTO: 43.3 % (ref 19.6–45.3)
MCH RBC QN AUTO: 29 PG (ref 26.6–33)
MCHC RBC AUTO-ENTMCNC: 33.5 G/DL (ref 31.5–35.7)
MCV RBC AUTO: 86.4 FL (ref 79–97)
MONOCYTES # BLD AUTO: 0.54 10*3/MM3 (ref 0.1–0.9)
MONOCYTES NFR BLD AUTO: 15.8 % (ref 5–12)
NEUTROPHILS NFR BLD AUTO: 1.13 10*3/MM3 (ref 1.7–7)
NEUTROPHILS NFR BLD AUTO: 33 % (ref 42.7–76)
PLATELET # BLD AUTO: 161 10*3/MM3 (ref 140–450)
PMV BLD AUTO: 9.6 FL (ref 6–12)
POTASSIUM SERPL-SCNC: 4.4 MMOL/L (ref 3.5–5.2)
PROT SERPL-MCNC: 6.9 G/DL (ref 6–8.5)
PSA SERPL-MCNC: 5.82 NG/ML (ref 0–4)
RBC # BLD AUTO: 4.42 10*6/MM3 (ref 4.14–5.8)
SODIUM SERPL-SCNC: 139 MMOL/L (ref 136–145)
TRIGL SERPL-MCNC: 208 MG/DL (ref 0–150)
VLDLC SERPL-MCNC: 35 MG/DL (ref 5–40)
WBC NRBC COR # BLD AUTO: 3.42 10*3/MM3 (ref 3.4–10.8)

## 2025-04-28 PROCEDURE — 83036 HEMOGLOBIN GLYCOSYLATED A1C: CPT | Performed by: FAMILY MEDICINE

## 2025-04-28 PROCEDURE — 85025 COMPLETE CBC W/AUTO DIFF WBC: CPT | Performed by: FAMILY MEDICINE

## 2025-04-28 PROCEDURE — 36415 COLL VENOUS BLD VENIPUNCTURE: CPT | Performed by: FAMILY MEDICINE

## 2025-04-28 PROCEDURE — 80061 LIPID PANEL: CPT | Performed by: FAMILY MEDICINE

## 2025-04-28 PROCEDURE — 80053 COMPREHEN METABOLIC PANEL: CPT | Performed by: FAMILY MEDICINE

## 2025-04-28 PROCEDURE — 84153 ASSAY OF PSA TOTAL: CPT

## 2025-04-28 RX ORDER — FENOFIBRATE 145 MG/1
145 TABLET, FILM COATED ORAL DAILY
Qty: 90 TABLET | Refills: 1 | Status: SHIPPED | OUTPATIENT
Start: 2025-04-28

## 2025-04-28 RX ORDER — LISINOPRIL 5 MG/1
5 TABLET ORAL DAILY
Qty: 90 TABLET | Refills: 1 | Status: SHIPPED | OUTPATIENT
Start: 2025-04-28

## 2025-04-28 NOTE — PROGRESS NOTES
Subjective   The ABCs of the Annual Wellness Visit  Medicare Wellness Visit      Judson Ochoa is a 79 y.o. patient who presents for a Medicare Wellness Visit.    The following portions of the patient's history were reviewed and   updated as appropriate: allergies, current medications, past family history, past medical history, past social history, past surgical history, and problem list.    Compared to one year ago, the patient's physical   health is the same.  Compared to one year ago, the patient's mental   health is the same.    Recent Hospitalizations:  He was not admitted to the hospital during the last year.     Current Medical Providers:  Patient Care Team:  Jose A Bell MD as PCP - General (Family Medicine)  Angel Ruvalcaba MD (Vascular Surgery)    Outpatient Medications Prior to Visit   Medication Sig Dispense Refill    amLODIPine (NORVASC) 5 MG tablet TAKE 1 TABLET BY MOUTH DAILY 90 tablet 1    fluticasone (FLONASE) 50 MCG/ACT nasal spray 2 sprays into the nostril(s) as directed by provider Daily. 9.9 mL 1    meclizine (ANTIVERT) 25 MG tablet Take 1 tablet by mouth 3 (Three) Times a Day As Needed for Dizziness. 30 tablet 1    neomycin-polymyxin-hydrocortisone (CORTISPORIN) 3.5-24334-9 otic solution Administer 3 drops to the right ear 3 (Three) Times a Day. 10 mL 0    ondansetron ODT (ZOFRAN-ODT) 4 MG disintegrating tablet Place 1 tablet on the tongue Every 8 (Eight) Hours As Needed for Nausea. 30 tablet 1    sildenafil (REVATIO) 20 MG tablet TAKE 1 TABLET BY MOUTH 1 HOUR BEFORE SEXUAL ACTIVITY AS NEEDED 20 tablet 0    fenofibrate (TRICOR) 145 MG tablet TAKE 1 TABLET BY MOUTH DAILY 90 tablet 1    lisinopril (PRINIVIL,ZESTRIL) 10 MG tablet TAKE 1 TABLET BY MOUTH DAILY 90 tablet 1    metFORMIN (GLUCOPHAGE) 500 MG tablet TAKE 1 TABLET BY MOUTH THREE TIMES DAILY 270 tablet 1     No facility-administered medications prior to visit.     No opioid medication identified on active medication list.  "I have reviewed chart for other potential  high risk medication/s and harmful drug interactions in the elderly.      Aspirin is not on active medication list.  Aspirin use is not indicated based on review of current medical condition/s. Risk of harm outweighs potential benefits.  .    Patient Active Problem List   Diagnosis    Cigarette smoker    Abdominal aortic aneurysm (AAA) 3.0 cm to 5.5 cm in diameter in male    Peripheral vascular disease, unspecified    Type 2 diabetes mellitus without complication, without long-term current use of insulin    Mixed hyperlipidemia    Paresthesia of upper extremity    EASTON on CPAP    PSA elevation    Chronic midline low back pain    Obstructive sleep apnea (adult) (pediatric)    Primary hypertension    Medicare annual wellness visit, subsequent    History of fall    Vasculogenic erectile dysfunction    Need for vaccination    Aneurysm of right popliteal artery    Bilateral iliac artery aneurysm    Dyslipidemia    Vaccination hesitancy by patient    Hearing deficit, bilateral     Advance Care Planning Advance Directive is not on file.  ACP discussion was held with the patient during this visit. Patient does not have an advance directive, information provided.            Objective   Vitals:    04/28/25 0931   BP: 122/58   BP Location: Right arm   Patient Position: Sitting   Pulse: 65   Temp: 97.5 °F (36.4 °C)   TempSrc: Temporal   SpO2: 98%   Weight: 91.5 kg (201 lb 12.8 oz)   Height: 190.5 cm (75\")   PainSc: 0-No pain       Estimated body mass index is 25.22 kg/m² as calculated from the following:    Height as of this encounter: 190.5 cm (75\").    Weight as of this encounter: 91.5 kg (201 lb 12.8 oz).    BMI is >= 25 and <30. (Overweight) The following options were offered after discussion;: Doing okay           Does the patient have evidence of cognitive impairment? No                                                                                                Health  Risk " Assessment    Smoking Status:  Social History     Tobacco Use   Smoking Status Every Day    Current packs/day: 1.00    Average packs/day: 1 pack/day for 60.0 years (60.0 ttl pk-yrs)    Types: Cigarettes    Passive exposure: Current   Smokeless Tobacco Never     Alcohol Consumption:  Social History     Substance and Sexual Activity   Alcohol Use Yes    Alcohol/week: 3.0 standard drinks of alcohol    Types: 3 Cans of beer per week    Comment: 1 can of Beer after garden or yard work       Fall Risk Screen  STEADI Fall Risk Assessment was completed, and patient is at LOW risk for falls.Assessment completed on:2025    Depression Screening   Little interest or pleasure in doing things? Not at all   Feeling down, depressed, or hopeless? Not at all   PHQ-2 Total Score 0      Health Habits and Functional and Cognitive Screenin/28/2025     9:45 AM   Functional & Cognitive Status   Do you have difficulty preparing food and eating? No   Do you have difficulty bathing yourself, getting dressed or grooming yourself? No   Do you have difficulty using the toilet? No   Do you have difficulty moving around from place to place? No   Do you have trouble with steps or getting out of a bed or a chair? No   Current Diet Frequent Junk Food   Dental Exam Up to date   Eye Exam Up to date   Exercise (times per week) 0 times per week   Current Exercises Include No Regular Exercise   Do you need help using the phone?  No   Are you deaf or do you have serious difficulty hearing?  Yes   Do you need help to go to places out of walking distance? No   Do you need help shopping? No   Do you need help preparing meals?  No   Do you need help with housework?  No   Do you need help with laundry? No   Do you need help taking your medications? No   Do you need help managing money? No   Do you ever drive or ride in a car without wearing a seat belt? No   Have you felt unusual stress, anger or loneliness in the last month? No   Who do you live  with? Spouse   If you need help, do you have trouble finding someone available to you? No   Have you been bothered in the last four weeks by sexual problems? No   Do you have difficulty concentrating, remembering or making decisions? No           Age-appropriate Screening Schedule:  Refer to the list below for future screening recommendations based on patient's age, sex and/or medical conditions. Orders for these recommended tests are listed in the plan section. The patient has been provided with a written plan.    Health Maintenance List  Health Maintenance   Topic Date Due    Pneumococcal Vaccine 50+ (1 of 2 - PCV) Never done    ZOSTER VACCINE (1 of 2) Never done    TDAP/TD VACCINES (3 - Td or Tdap) 03/31/2022    RSV Vaccine - Adults (1 - 1-dose 75+ series) Never done    DIABETIC FOOT EXAM  04/22/2023    COVID-19 Vaccine (1 - 2024-25 season) Never done    HEMOGLOBIN A1C  04/14/2025    LIPID PANEL  04/26/2025    URINE MICROALBUMIN-CREATININE RATIO (uACR)  04/26/2025    LUNG CANCER SCREENING  04/28/2026 (Originally 5/26/1997)    INFLUENZA VACCINE  07/01/2025    DIABETIC EYE EXAM  11/27/2025    ANNUAL WELLNESS VISIT  04/28/2026    HEPATITIS C SCREENING  Completed    COLORECTAL CANCER SCREENING  Discontinued                                                                                                                                                CMS Preventative Services Quick Reference  Risk Factors Identified During Encounter  Hearing Problem:  Encouraged him to wear his hearing aids    The above risks/problems have been discussed with the patient.  Pertinent information has been shared with the patient in the After Visit Summary.  An After Visit Summary and PPPS were made available to the patient.    Follow Up:   Next Medicare Wellness visit to be scheduled in 1 year.         Additional E&M Note during same encounter follows:  Patient has additional, significant, and separately identifiable  "condition(s)/problem(s) that require work above and beyond the Medicare Wellness Visit     Chief Complaint  Medicare Wellness-subsequent, Hypertension, and Diabetes    Subjective   HPI  Judson is also being seen today for additional medical problem/s.    Was seen at [facility] for evaluation of hearing aids  He still isn't wearing them on regular basis.   [facility].  Hemoglobin A1c at that time was 7.0.  He does follow his blood sugars about 3 times a week says lately they have been running in the 200 range.  He thinks it is because he has not been doing much exercise since hunting season is over.    Appears that he has not been taking lisinopril.    He does continue to smoke usually about a pack a day.  Discussed lung cancer screening recommendations with the patient using the Lung Cancer Screening shared decision-making tool, including benefits and risks of a low-dose lung CT scan. The patient has declined screening at this time. Will revisit at future visits as appropriate.                 Objective   Vital Signs:  /58 (BP Location: Right arm, Patient Position: Sitting)   Pulse 65   Temp 97.5 °F (36.4 °C) (Temporal)   Ht 190.5 cm (75\")   Wt 91.5 kg (201 lb 12.8 oz)   SpO2 98%   BMI 25.22 kg/m²   Physical Exam  Vitals and nursing note reviewed.   Constitutional:       General: He is not in acute distress.     Appearance: Normal appearance.   HENT:      Right Ear: Tympanic membrane and ear canal normal.      Left Ear: Tympanic membrane and ear canal normal.      Ears:      Comments: He has noted to be hard of hearing     Mouth/Throat:      Mouth: Mucous membranes are moist.      Pharynx: Oropharynx is clear. No oropharyngeal exudate.   Eyes:      General: No scleral icterus.     Conjunctiva/sclera: Conjunctivae normal.   Neck:      Vascular: No carotid bruit.   Cardiovascular:      Rate and Rhythm: Normal rate and regular rhythm.      Heart " sounds: No murmur heard.     No friction rub. No gallop.   Pulmonary:      Effort: No respiratory distress.      Breath sounds: No wheezing or rales.   Abdominal:      General: Bowel sounds are normal.      Palpations: Abdomen is soft. There is no mass.      Tenderness: There is no abdominal tenderness. There is no guarding or rebound.   Musculoskeletal:         General: No swelling.      Right lower leg: No edema.      Left lower leg: No edema.   Lymphadenopathy:      Cervical: No cervical adenopathy.   Skin:     Coloration: Skin is not jaundiced.      Findings: No lesion.   Neurological:      General: No focal deficit present.      Mental Status: He is alert and oriented to person, place, and time.   Psychiatric:         Mood and Affect: Mood normal.         Behavior: Behavior normal.         Thought Content: Thought content normal.         Judgment: Judgment normal.         The following data was reviewed by: Jose A Bell MD on 04/28/2025:      Reviewed labs from [facility] November 2024 (scanned)     Assessment and Plan      Medicare annual wellness visit, subsequent  We reviewed the preventive service recommendations and created an individualized handout.  He is vaccine hesitant.       Type 2 diabetes mellitus without complication, without long-term current use of insulin  Will check lab predicate changes based on results.  Currently tolerating the metformin. Reports blood sugars been running a little bit higher.  Will get hemoglobin A1c and predicate medication change based on results    Orders:    Hemoglobin A1c    metFORMIN (GLUCOPHAGE) 500 MG tablet; Take 1 tablet by mouth 3 (Three) Times a Day.    Primary hypertension  Will reinitiate lisinopril since he is diabetic and will get renal protection.  Will lower the dose however to 5 mg.    Orders:    Comprehensive Metabolic Panel    CBC Auto Differential    Mixed hyperlipidemia  Labs today predicate change based on results.  His labs from  November looked okay.    Orders:    Lipid Panel    Comprehensive Metabolic Panel    fenofibrate (TRICOR) 145 MG tablet; Take 1 tablet by mouth Daily.    Abdominal aortic aneurysm (AAA) 3.0 cm to 5.5 cm in diameter in male  Continues to follow with vascular surgery [facility].  Has been scheduled for CT scans and follow-up with vascular surgeon June 9       Peripheral vascular disease, unspecified  As above       Cigarette smoker  Not interested in cessation.  Offered to work with him if he changes his mind.  Declines LDCT.       Hypertension, essential      Orders:    lisinopril (PRINIVIL,ZESTRIL) 5 MG tablet; Take 1 tablet by mouth Daily.    Obstructive sleep apnea (adult) (pediatric)  He did repeat a home sleep study recently.  [facility]         Hearing deficit, bilateral  Encouraged him to wear his hearing aids.  Discussed how loss of hearing can be contributor to cognitive decline.                 Follow Up   No follow-ups on file.  Patient was given instructions and counseling regarding his condition or for health maintenance advice. Please see specific information pulled into the AVS if appropriate.

## 2025-04-28 NOTE — ASSESSMENT & PLAN NOTE
Not interested in cessation.  Offered to work with him if he changes his mind.  Declines LDCT.

## 2025-04-28 NOTE — ASSESSMENT & PLAN NOTE
Labs today predicate change based on results.  His labs from November looked okay.    Orders:    Lipid Panel    Comprehensive Metabolic Panel    fenofibrate (TRICOR) 145 MG tablet; Take 1 tablet by mouth Daily.

## 2025-04-28 NOTE — ASSESSMENT & PLAN NOTE
Will reinitiate lisinopril since he is diabetic and will get renal protection.  Will lower the dose however to 5 mg.    Orders:    Comprehensive Metabolic Panel    CBC Auto Differential

## 2025-04-28 NOTE — ASSESSMENT & PLAN NOTE
He did repeat a home sleep study recently.  He is [facility].

## 2025-04-28 NOTE — ASSESSMENT & PLAN NOTE
Will check lab predicate changes based on results.  Currently tolerating the metformin. Reports blood sugars been running a little bit higher.  Will get hemoglobin A1c and predicate medication change based on results    Orders:    Hemoglobin A1c    metFORMIN (GLUCOPHAGE) 500 MG tablet; Take 1 tablet by mouth 3 (Three) Times a Day.

## 2025-04-28 NOTE — ASSESSMENT & PLAN NOTE
We reviewed the preventive service recommendations and created an individualized handout.  He is vaccine hesitant.

## 2025-04-28 NOTE — ASSESSMENT & PLAN NOTE
Encouraged him to wear his hearing aids.  Discussed how loss of hearing can be contributor to cognitive decline.

## 2025-07-02 ENCOUNTER — OFFICE VISIT (OUTPATIENT)
Dept: UROLOGY | Facility: CLINIC | Age: 80
End: 2025-07-02
Payer: MEDICARE

## 2025-07-02 VITALS — BODY MASS INDEX: 24.65 KG/M2 | WEIGHT: 197.2 LBS

## 2025-07-02 DIAGNOSIS — R97.20 ELEVATED PROSTATE SPECIFIC ANTIGEN (PSA): Primary | ICD-10-CM

## 2025-07-02 PROBLEM — Z23 NEED FOR VACCINATION: Status: RESOLVED | Noted: 2023-10-24 | Resolved: 2025-07-02

## 2025-07-02 PROBLEM — R20.2 PARESTHESIA OF UPPER EXTREMITY: Status: RESOLVED | Noted: 2021-06-21 | Resolved: 2025-07-02

## 2025-07-02 PROBLEM — Z00.00 MEDICARE ANNUAL WELLNESS VISIT, SUBSEQUENT: Status: RESOLVED | Noted: 2022-04-22 | Resolved: 2025-07-02

## 2025-07-02 PROBLEM — Z28.21 VACCINATION HESITANCY BY PATIENT: Status: RESOLVED | Noted: 2024-10-14 | Resolved: 2025-07-02

## 2025-07-02 NOTE — PROGRESS NOTES
Chief Complaint: Elevated PSA    Subjective         Primary Care Follow-Up    Judson Ochoa is a 80 y.o. male presents to [facility] to be seen for f/u elevated PSA.    He remains on no prostate meds.     Nocturia x 1     Stream is good.     No straining or hesitancy.     Some urgency at times.     Denies bothersome frequency.     No leakage.     Psa from 4/2025 is still 5.82    PSA density 0.0765        Previous:     Underwent MRI of the prostate on 7/29/2024.    MRI revealed 76 cc prostate with fully encapsulated partially extruded BPH nodule in the left peripheral zone documented PI-RADS 2 low clinically significant cancer unlikely.    He presents today with a repeat PSA from 10/14/2024 which is now down to 5.82    No bothersome urinary symptoms.     He states decreased stream on awakening.        Previous:  Patient saw his primary care provider on 5/13/2024 for follow-up and had labs prior to being seen.    The patient apparently had an abnormal PSA on screening starting approximately 3 years ago.    His PSA has been over 4 for some time.    PSA was performed 4/24/2023 and was noted to be 5.19 PSA was repeated on 4/26/2024 and was noted to be 8.74.    It appears that the patient has been seen by urology previously at [facility] for BPH and renal cyst.  Last seen in March 2021.    He states urinary stream is slower in the morning better during the day.     Nocturia x 1    Denies perineal pain.     No burning with urination.     He does have back pain that is chronic worse in the last several weeks after driving t posts into the ground.    He was told that his elevated psa may be related to an infection was given bactrim for 14 days starting yesterday.    No family hx of gu malignancies that he knows of but he has a cousin an younger brother that have had prostate removed.      He does have longevity in his family father lived to be 100 great aunt 110.    He is on no  anticoagulants.      Objective     Past Medical History:   Diagnosis Date    Abdominal aortic aneurysm without rupture     Anemia     Arthritis     Decreased white blood cell count     Elevated prostate specific antigen (PSA)     Erectile dysfunction     Hematospermia     HL (hearing loss)     Hypertension     Low back pain     Male erectile disorder     Male erectile dysfunction     Mixed hyperlipidemia     Neoplasm of uncertain behavior of left kidney     Nicotine dependence     Obstructive sleep apnea (adult) (pediatric)     Palmar fascial fibromatosis     Peripheral vascular disease     Type 2 diabetes mellitus without complication        Past Surgical History:   Procedure Laterality Date    ABDOMINAL AORTIC ANEURYSM REPAIR  2018    endovascular stenting    CIRCUMCISION  2005    COLONOSCOPY  10/2007    normal    CYSTOSCOPY           Current Outpatient Medications:     amLODIPine (NORVASC) 5 MG tablet, TAKE 1 TABLET BY MOUTH DAILY, Disp: 90 tablet, Rfl: 1    fenofibrate (TRICOR) 145 MG tablet, Take 1 tablet by mouth Daily., Disp: 90 tablet, Rfl: 1    fluticasone (FLONASE) 50 MCG/ACT nasal spray, 2 sprays into the nostril(s) as directed by provider Daily., Disp: 9.9 mL, Rfl: 1    lisinopril (PRINIVIL,ZESTRIL) 5 MG tablet, Take 1 tablet by mouth Daily., Disp: 90 tablet, Rfl: 1    meclizine (ANTIVERT) 25 MG tablet, Take 1 tablet by mouth 3 (Three) Times a Day As Needed for Dizziness., Disp: 30 tablet, Rfl: 1    metFORMIN (GLUCOPHAGE) 500 MG tablet, Take 1 tablet by mouth 3 (Three) Times a Day., Disp: 270 tablet, Rfl: 1    neomycin-polymyxin-hydrocortisone (CORTISPORIN) 3.5-29209-4 otic solution, Administer 3 drops to the right ear 3 (Three) Times a Day., Disp: 10 mL, Rfl: 0    ondansetron ODT (ZOFRAN-ODT) 4 MG disintegrating tablet, Place 1 tablet on the tongue Every 8 (Eight) Hours As Needed for Nausea., Disp: 30 tablet, Rfl: 1    sildenafil (REVATIO) 20 MG tablet, TAKE 1 TABLET BY MOUTH 1 HOUR BEFORE SEXUAL  ACTIVITY AS NEEDED, Disp: 20 tablet, Rfl: 0    No Known Allergies     Family History   Problem Relation Age of Onset    Heart failure Mother         cause of death    Coronary artery disease Mother     Diabetes type II Mother     Diabetes Mother     Hearing loss Mother     Diabetes type II Father     Stroke Brother     Diabetes type I Brother        Social History     Socioeconomic History    Marital status:     Number of children: 1   Tobacco Use    Smoking status: Every Day     Current packs/day: 1.00     Average packs/day: 1 pack/day for 60.0 years (60.0 ttl pk-yrs)     Types: Cigarettes     Passive exposure: Current    Smokeless tobacco: Never   Vaping Use    Vaping status: Never Used   Substance and Sexual Activity    Alcohol use: Yes     Alcohol/week: 3.0 standard drinks of alcohol     Types: 3 Cans of beer per week     Comment: 1 can of Beer after garden or yard work    Drug use: Never    Sexual activity: Yes     Partners: Female       Vital Signs:   Wt 89.4 kg (197 lb 3.2 oz)   BMI 24.65 kg/m²      Physical Exam     Result Review :   The following data was reviewed by: CARIDAD Wilkes on 07/02/2025:  Results for orders placed or performed in visit on 04/28/25   PSA DIAGNOSTIC    Collection Time: 04/28/25 10:34 AM    Specimen: Blood   Result Value Ref Range    PSA 5.820 (H) 0.000 - 4.000 ng/mL      PSA          10/14/2024    10:16 4/28/2025    10:34   PSA   PSA 5.820  5.820          Procedures        Assessment and Plan    Diagnoses and all orders for this visit:    1. Elevated prostate specific antigen (PSA) (Primary)  -     PSA DIAGNOSTIC; Future          PSA density is low at this time.     psa has remained the exact same.    He states no bothersome urinary symptoms at this time.    We will plan for f/u in 6 months with a repeat Psa if still stable then we will do annual f/u.     I spent 10 minutes caring for Judson on this date of service. This time includes time spent by me in the following  activities:reviewing tests, obtaining and/or reviewing a separately obtained history, performing a medically appropriate examination and/or evaluation , counseling and educating the patient/family/caregiver, ordering medications, tests, or procedures, and documenting information in the medical record  Follow Up   Return in about 6 months (around 1/2/2026) for f/u elevated psa .  Patient was given instructions and counseling regarding his condition or for health maintenance advice. Please see specific information pulled into the AVS if appropriate.         This document has been electronically signed by CARIDAD Wilkes  July 2, 2025 10:00 EDT

## 2025-07-26 DIAGNOSIS — E78.2 MIXED HYPERLIPIDEMIA: ICD-10-CM

## 2025-07-26 DIAGNOSIS — E11.9 TYPE 2 DIABETES MELLITUS WITHOUT COMPLICATION, WITHOUT LONG-TERM CURRENT USE OF INSULIN: ICD-10-CM

## 2025-07-28 RX ORDER — FENOFIBRATE 145 MG/1
145 TABLET, FILM COATED ORAL DAILY
Qty: 90 TABLET | Refills: 1 | OUTPATIENT
Start: 2025-07-28

## 2025-07-29 DIAGNOSIS — I10 HYPERTENSION, ESSENTIAL: ICD-10-CM

## 2025-07-29 RX ORDER — AMLODIPINE BESYLATE 5 MG/1
5 TABLET ORAL DAILY
Qty: 90 TABLET | Refills: 1 | OUTPATIENT
Start: 2025-07-29

## 2025-07-29 RX ORDER — LISINOPRIL 5 MG/1
5 TABLET ORAL DAILY
Qty: 90 TABLET | Refills: 1 | OUTPATIENT
Start: 2025-07-29